# Patient Record
Sex: FEMALE | Race: WHITE | NOT HISPANIC OR LATINO | Employment: FULL TIME | URBAN - METROPOLITAN AREA
[De-identification: names, ages, dates, MRNs, and addresses within clinical notes are randomized per-mention and may not be internally consistent; named-entity substitution may affect disease eponyms.]

---

## 2019-10-04 ENCOUNTER — PREP FOR PROCEDURE (OUTPATIENT)
Dept: GASTROENTEROLOGY | Facility: CLINIC | Age: 19
End: 2019-10-04

## 2019-10-04 ENCOUNTER — OFFICE VISIT (OUTPATIENT)
Dept: GASTROENTEROLOGY | Facility: CLINIC | Age: 19
End: 2019-10-04
Payer: COMMERCIAL

## 2019-10-04 ENCOUNTER — TELEPHONE (OUTPATIENT)
Dept: GASTROENTEROLOGY | Facility: CLINIC | Age: 19
End: 2019-10-04

## 2019-10-04 VITALS
HEART RATE: 72 BPM | BODY MASS INDEX: 35.22 KG/M2 | DIASTOLIC BLOOD PRESSURE: 90 MMHG | SYSTOLIC BLOOD PRESSURE: 128 MMHG | HEIGHT: 69 IN | WEIGHT: 237.8 LBS

## 2019-10-04 DIAGNOSIS — K59.00 CONSTIPATION, UNSPECIFIED CONSTIPATION TYPE: ICD-10-CM

## 2019-10-04 DIAGNOSIS — R10.84 GENERALIZED ABDOMINAL PAIN: Primary | ICD-10-CM

## 2019-10-04 DIAGNOSIS — R10.9 ABDOMINAL PAIN, UNSPECIFIED ABDOMINAL LOCATION: Primary | ICD-10-CM

## 2019-10-04 DIAGNOSIS — R19.7 DIARRHEA, UNSPECIFIED TYPE: ICD-10-CM

## 2019-10-04 PROCEDURE — 99203 OFFICE O/P NEW LOW 30 MIN: CPT | Performed by: PHYSICIAN ASSISTANT

## 2019-10-04 RX ORDER — DICYCLOMINE HCL 20 MG
20 TABLET ORAL EVERY 6 HOURS
Qty: 60 TABLET | Refills: 3 | Status: SHIPPED | OUTPATIENT
Start: 2019-10-04 | End: 2021-07-21 | Stop reason: ALTCHOICE

## 2019-10-04 NOTE — PROGRESS NOTES
[facility] - Outpatient Consultation  Mike Devi 22 y o  female MRN: [id number]  Encounter: [id number]          ASSESSMENT AND PLAN:      1  Generalized abdominal pain  2  Diarrhea, unspecified type  3  Constipation, unspecified constipation type    Suspect she has IBS  Will obtain recent labs  Will plan EGD and colonoscopy to rule out crohns/celiac  Will start fiber, probiotic and dicyclomine  Discussed low dairy, spice and fat diet    ______________________________________________________________________    HPI:  18-year-old female presents for evaluation of abdominal pain, diarrhea and constipation  Is on ongoing for many years however there worsening over the past year  She believes that they became worse after IUD was placed  The IUD position has been checked several times by her gynecologist she states that she frequently gets severe abdominal pains which were followed by the urge to have a bowel movement  She reports that sometimes she is able to have a bowel movement is loose and watery other times she can have bowel movement  She denies any rectal bleeding or unexpected weight loss  She does admit to frequently waking up in the middle the night with abdominal pain  There is no fevers or chills  She has never had any workup for these symptoms  She believes that breads and pastas make her symptoms worse however she also relates that fried fatty foods worsen her symptoms  There is no family history of any series gastrointestinal issues  She denies any travel, sick contacts, antibiotics     She is on no over-the-counter medications  REVIEW OF SYSTEMS:    CONSTITUTIONAL: Denies any fever, chills, rigors, and weight loss  HEENT: No earache or tinnitus  Denies hearing loss or visual disturbances  CARDIOVASCULAR: No chest pain or palpitations  RESPIRATORY: Denies any cough, hemoptysis, shortness of breath or dyspnea on exertion    GASTROINTESTINAL: As noted in the History of Present Illness  GENITOURINARY: No problems with urination  Denies any hematuria or dysuria  NEUROLOGIC: No dizziness or vertigo, denies headaches  MUSCULOSKELETAL: Denies any muscle or joint pain  SKIN: Denies skin rashes or itching  ENDOCRINE: Denies excessive thirst  Denies intolerance to heat or cold  PSYCHOSOCIAL: Denies depression or anxiety  Denies any recent memory loss  Historical Information   Past Medical History:   Diagnosis Date    Medical history reviewed with no changes      History reviewed  No pertinent surgical history  Social History   Social History     Substance and Sexual Activity   Alcohol Use Yes    Comment: social     Social History     Substance and Sexual Activity   Drug Use Never     Social History     Tobacco Use   Smoking Status Never Smoker   Smokeless Tobacco Never Used     Family History   Problem Relation Age of Onset    No Known Problems Mother     No Known Problems Father        Meds/Allergies       Current Outpatient Medications:     levonorgestrel (KYLEENA) 19 5 MG intrauterine device    dicyclomine (BENTYL) 20 mg tablet    No Known Allergies        Objective     Blood pressure 128/90, pulse 72, height 5' 9" (1 753 m), weight 108 kg (237 lb 12 8 oz)  Body mass index is 35 12 kg/m²  PHYSICAL EXAM:      General Appearance:   Alert, cooperative, no distress   HEENT:   Normocephalic, atraumatic, anicteric      Neck:  Supple, symmetrical, trachea midline   Lungs:   Clear to auscultation bilaterally; no rales, rhonchi or wheezing; respirations unlabored    Heart[de-identified]   Regular rate and rhythm; no murmur, rub, or gallop     Abdomen:   Soft, non-tender, non-distended; normal bowel sounds; no masses, no organomegaly    Genitalia:   Deferred    Rectal:   Deferred    Extremities:  No cyanosis, clubbing or edema    Pulses:  2+ and symmetric    Skin:  No jaundice, rashes, or lesions    Lymph nodes:  No palpable cervical lymphadenopathy        Lab Results:   No visits with results within 1 Day(s) from this visit  Latest known visit with results is:   No results found for any previous visit  Radiology Results:   No results found

## 2019-10-04 NOTE — TELEPHONE ENCOUNTER
As requested by Manolo Macias, called [facility] to get bw for pt for the last year    Will have bw faxed

## 2019-10-11 ENCOUNTER — APPOINTMENT (OUTPATIENT)
Dept: LAB | Facility: HOSPITAL | Age: 19
End: 2019-10-11
Payer: COMMERCIAL

## 2019-10-11 DIAGNOSIS — R10.9 ABDOMINAL PAIN, UNSPECIFIED ABDOMINAL LOCATION: ICD-10-CM

## 2019-10-11 LAB
ALBUMIN SERPL BCP-MCNC: 3.8 G/DL (ref 3.5–5)
ALP SERPL-CCNC: 72 U/L (ref 46–116)
ALT SERPL W P-5'-P-CCNC: 23 U/L (ref 12–78)
ANION GAP SERPL CALCULATED.3IONS-SCNC: 5 MMOL/L (ref 4–13)
AST SERPL W P-5'-P-CCNC: 14 U/L (ref 5–45)
BILIRUB SERPL-MCNC: 0.5 MG/DL (ref 0.2–1)
BUN SERPL-MCNC: 12 MG/DL (ref 5–25)
CALCIUM SERPL-MCNC: 9.1 MG/DL (ref 8.3–10.1)
CHLORIDE SERPL-SCNC: 104 MMOL/L (ref 100–108)
CO2 SERPL-SCNC: 30 MMOL/L (ref 21–32)
CREAT SERPL-MCNC: 0.78 MG/DL (ref 0.6–1.3)
CRP SERPL QL: 4.8 MG/L
GFR SERPL CREATININE-BSD FRML MDRD: 109 ML/MIN/1.73SQ M
GLUCOSE SERPL-MCNC: 72 MG/DL (ref 65–140)
POTASSIUM SERPL-SCNC: 4.4 MMOL/L (ref 3.5–5.3)
PROT SERPL-MCNC: 8 G/DL (ref 6.4–8.2)
SODIUM SERPL-SCNC: 139 MMOL/L (ref 136–145)
T4 FREE SERPL-MCNC: 0.75 NG/DL (ref 0.76–1.46)
TSH SERPL DL<=0.05 MIU/L-ACNC: 1.39 UIU/ML (ref 0.36–3.74)

## 2019-10-11 PROCEDURE — 84443 ASSAY THYROID STIM HORMONE: CPT

## 2019-10-11 PROCEDURE — 86140 C-REACTIVE PROTEIN: CPT

## 2019-10-11 PROCEDURE — 80053 COMPREHEN METABOLIC PANEL: CPT

## 2019-10-11 PROCEDURE — 83516 IMMUNOASSAY NONANTIBODY: CPT

## 2019-10-11 PROCEDURE — 36415 COLL VENOUS BLD VENIPUNCTURE: CPT

## 2019-10-11 PROCEDURE — 84439 ASSAY OF FREE THYROXINE: CPT

## 2019-10-11 PROCEDURE — 82784 ASSAY IGA/IGD/IGG/IGM EACH: CPT

## 2019-10-11 PROCEDURE — 86255 FLUORESCENT ANTIBODY SCREEN: CPT

## 2019-10-12 LAB
ENDOMYSIUM IGA SER QL: NEGATIVE
GLIADIN PEPTIDE IGA SER-ACNC: 6 UNITS (ref 0–19)
GLIADIN PEPTIDE IGG SER-ACNC: 3 UNITS (ref 0–19)
IGA SERPL-MCNC: 324 MG/DL (ref 87–352)
TTG IGA SER-ACNC: <2 U/ML (ref 0–3)
TTG IGG SER-ACNC: <2 U/ML (ref 0–5)

## 2019-11-13 ENCOUNTER — TELEPHONE (OUTPATIENT)
Dept: SURGERY | Facility: HOSPITAL | Age: 19
End: 2019-11-13

## 2019-11-14 ENCOUNTER — TELEPHONE (OUTPATIENT)
Dept: SURGERY | Facility: HOSPITAL | Age: 19
End: 2019-11-14

## 2019-11-14 RX ORDER — SODIUM CHLORIDE, SODIUM LACTATE, POTASSIUM CHLORIDE, CALCIUM CHLORIDE 600; 310; 30; 20 MG/100ML; MG/100ML; MG/100ML; MG/100ML
125 INJECTION, SOLUTION INTRAVENOUS CONTINUOUS
Status: CANCELLED | OUTPATIENT
Start: 2019-11-14

## 2019-11-15 ENCOUNTER — HOSPITAL ENCOUNTER (OUTPATIENT)
Dept: GASTROENTEROLOGY | Facility: HOSPITAL | Age: 19
Setting detail: OUTPATIENT SURGERY
Discharge: HOME | End: 2019-11-15
Attending: INTERNAL MEDICINE
Payer: COMMERCIAL

## 2019-11-15 ENCOUNTER — ANESTHESIA (OUTPATIENT)
Dept: GASTROENTEROLOGY | Facility: HOSPITAL | Age: 19
End: 2019-11-15

## 2019-11-15 ENCOUNTER — ANESTHESIA EVENT (OUTPATIENT)
Dept: GASTROENTEROLOGY | Facility: HOSPITAL | Age: 19
End: 2019-11-15

## 2019-11-15 VITALS
HEART RATE: 69 BPM | OXYGEN SATURATION: 96 % | HEIGHT: 69 IN | DIASTOLIC BLOOD PRESSURE: 74 MMHG | RESPIRATION RATE: 16 BRPM | SYSTOLIC BLOOD PRESSURE: 116 MMHG | WEIGHT: 238.76 LBS | TEMPERATURE: 97.8 F | BODY MASS INDEX: 35.36 KG/M2

## 2019-11-15 DIAGNOSIS — R10.84 GENERALIZED ABDOMINAL PAIN: ICD-10-CM

## 2019-11-15 DIAGNOSIS — R19.7 DIARRHEA, UNSPECIFIED TYPE: ICD-10-CM

## 2019-11-15 DIAGNOSIS — K59.00 CONSTIPATION, UNSPECIFIED CONSTIPATION TYPE: ICD-10-CM

## 2019-11-15 LAB
EXT PREGNANCY TEST URINE: NEGATIVE
EXT. CONTROL: NORMAL

## 2019-11-15 PROCEDURE — 81025 URINE PREGNANCY TEST: CPT | Performed by: ANESTHESIOLOGY

## 2019-11-15 PROCEDURE — 45378 DIAGNOSTIC COLONOSCOPY: CPT | Performed by: INTERNAL MEDICINE

## 2019-11-15 PROCEDURE — NC001 PR NO CHARGE: Performed by: INTERNAL MEDICINE

## 2019-11-15 PROCEDURE — 88305 TISSUE EXAM BY PATHOLOGIST: CPT | Performed by: PATHOLOGY

## 2019-11-15 PROCEDURE — 43239 EGD BIOPSY SINGLE/MULTIPLE: CPT | Performed by: INTERNAL MEDICINE

## 2019-11-15 PROCEDURE — 88342 IMHCHEM/IMCYTCHM 1ST ANTB: CPT | Performed by: PATHOLOGY

## 2019-11-15 RX ORDER — LIDOCAINE HYDROCHLORIDE 20 MG/ML
INJECTION, SOLUTION INFILTRATION; PERINEURAL AS NEEDED
Status: DISCONTINUED | OUTPATIENT
Start: 2019-11-15 | End: 2019-11-15 | Stop reason: SURG

## 2019-11-15 RX ORDER — PROPOFOL 10 MG/ML
INJECTION, EMULSION INTRAVENOUS AS NEEDED
Status: DISCONTINUED | OUTPATIENT
Start: 2019-11-15 | End: 2019-11-15 | Stop reason: SURG

## 2019-11-15 RX ORDER — FENTANYL CITRATE 50 UG/ML
INJECTION, SOLUTION INTRAMUSCULAR; INTRAVENOUS AS NEEDED
Status: DISCONTINUED | OUTPATIENT
Start: 2019-11-15 | End: 2019-11-15 | Stop reason: SURG

## 2019-11-15 RX ORDER — SODIUM CHLORIDE, SODIUM LACTATE, POTASSIUM CHLORIDE, CALCIUM CHLORIDE 600; 310; 30; 20 MG/100ML; MG/100ML; MG/100ML; MG/100ML
125 INJECTION, SOLUTION INTRAVENOUS CONTINUOUS
Status: DISCONTINUED | OUTPATIENT
Start: 2019-11-15 | End: 2019-11-19 | Stop reason: HOSPADM

## 2019-11-15 RX ADMIN — LIDOCAINE HYDROCHLORIDE 5 ML: 20 INJECTION, SOLUTION INFILTRATION; PERINEURAL at 08:14

## 2019-11-15 RX ADMIN — PROPOFOL 5 MG: 10 INJECTION, EMULSION INTRAVENOUS at 08:17

## 2019-11-15 RX ADMIN — PROPOFOL 50 MG: 10 INJECTION, EMULSION INTRAVENOUS at 08:19

## 2019-11-15 RX ADMIN — PROPOFOL 100 MG: 10 INJECTION, EMULSION INTRAVENOUS at 08:27

## 2019-11-15 RX ADMIN — PROPOFOL 150 MG: 10 INJECTION, EMULSION INTRAVENOUS at 08:14

## 2019-11-15 RX ADMIN — FENTANYL CITRATE 50 MCG: 50 INJECTION, SOLUTION INTRAMUSCULAR; INTRAVENOUS at 08:14

## 2019-11-15 NOTE — ANESTHESIA POSTPROCEDURE EVALUATION
Post-Op Assessment Note    CV Status:  Stable  Pain Score: 0    Pain management: adequate     Mental Status:  Alert and awake   Hydration Status:  Euvolemic   PONV Controlled:  Controlled   Airway Patency:  Patent   Post Op Vitals Reviewed: Yes      Staff: Anesthesiologist, CRNA           /69 (11/15/19 0833)    Temp 98 1 °F (36 7 °C) (11/15/19 0833)    Pulse 71 (11/15/19 0833)   Resp 16 (11/15/19 0833)    SpO2 100 % (11/15/19 4900)

## 2019-11-15 NOTE — DISCHARGE INSTRUCTIONS
Upper Endoscopy   WHAT YOU NEED TO KNOW:   An upper endoscopy is also called an upper gastrointestinal (GI) endoscopy, or an esophagogastroduodenoscopy (EGD)  You may feel bloated, gassy, or have some abdominal discomfort after your procedure  Your throat may be sore for 24 to 36 hours  You may burp or pass gas from air that is still inside your body  DISCHARGE INSTRUCTIONS:   Call 911 for any of the following:   · You have sudden chest pain or trouble breathing  Seek care immediately if:   · You feel dizzy or faint  · You have trouble swallowing  · Your bowel movements are very dark or black  · Your abdomen is hard and firm and you have severe pain  · You vomit blood  Contact your healthcare provider if:   · You feel full or bloated and cannot burp or pass gas  · You have not had a bowel movement for 3 days after your procedure  · You have neck pain  · You have a fever or chills  · You have nausea or are vomiting  · You have a rash or hives  · You have questions or concerns about your endoscopy  Relieve a sore throat:  Suck on throat lozenges or crushed ice  Gargle with a small amount of warm salt water  Mix 1 teaspoon of salt and 1 cup of warm water to make salt water  Relieve gas and discomfort from bloating:  Lie on your right side with a heating pad on your abdomen  Take short walks to help pass gas  Eat small meals until bloating is relieved  Rest after your procedure: You have been given medicine to relax you  Do not  drive or make important decisions until the day after your procedure  Return to your normal activity as directed  You can usually return to work the day after your procedure  Follow up with your healthcare provider as directed:  Write down your questions so you remember to ask them during your visits     © 2017 [address] is for End User's use only and may not be sold, redistributed or otherwise used for commercial purposes  All illustrations and images included in CareNotes® are the copyrighted property of [employer]  or [employer]  The above information is an  only  It is not intended as medical advice for individual conditions or treatments  Talk to your doctor, nurse or pharmacist before following any medical regimen to see if it is safe and effective for you  Colonoscopy   WHAT YOU NEED TO KNOW:   A colonoscopy is a procedure to examine the inside of your colon (intestine) with a scope  Polyps or tissue growths may have been removed during your colonoscopy  It is normal to feel bloated and to have some abdominal discomfort  You should be passing gas  If you have hemorrhoids or you had polyps removed, you may have a small amount of bleeding  DISCHARGE INSTRUCTIONS:   Seek care immediately if:   · You have a large amount of bright red blood in your bowel movements  · Your abdomen is hard and firm and you have severe pain  · You have sudden trouble breathing  Contact your healthcare provider if:   · You develop a rash or hives  · You have a fever within 24 hours of your procedure       · You have not had a bowel movement for 3 days after your procedure  · You have questions or concerns about your condition or care  Activity:   · Do not lift, strain, or run  for 3 days after your procedure  · Rest after your procedure  You have been given medicine to relax you  Do not  drive or make important decisions until the day after your procedure  Return to your normal activity as directed  · Relieve gas and discomfort from bloating  by lying on your right side with a heating pad on your abdomen  You may need to take short walks to help the gas move out  Eat small meals until bloating is relieved  If you had polyps removed: For 7 days after your procedure:  · Do not  take aspirin  · Do not  go on long car rides    Follow up with your healthcare provider as directed:  Write down your questions so you remember to ask them during your visits  © 2017 [address] is for End User's use only and may not be sold, redistributed or otherwise used for commercial purposes  All illustrations and images included in CareNotes® are the copyrighted property of [employer]  or [employer]  The above information is an  only  It is not intended as medical advice for individual conditions or treatments  Talk to your doctor, nurse or pharmacist before following any medical regimen to see if it is safe and effective for you

## 2019-11-15 NOTE — ANESTHESIA PREPROCEDURE EVALUATION
Review of Systems/Medical History  Patient summary reviewed        Cardiovascular  Negative cardio ROS    Pulmonary  Negative pulmonary ROS        GI/Hepatic  Negative GI/hepatic ROS          Negative  ROS        Endo/Other  Negative endo/other ROS      GYN  Negative gynecology ROS          Hematology  Negative hematology ROS      Musculoskeletal  Negative musculoskeletal ROS        Neurology  Negative neurology ROS      Psychology   Negative psychology ROS              Physical Exam    Airway    Mallampati score: I  TM Distance: >3 FB  Neck ROM: full     Dental       Cardiovascular  Comment: Negative ROS, Cardiovascular exam normal    Pulmonary  Pulmonary exam normal     Other Findings        Anesthesia Plan  ASA Score- 1     Anesthesia Type- IV sedation with anesthesia with ASA Monitors  Additional Monitors:   Airway Plan:         Plan Factors-    Induction- intravenous  Postoperative Plan-     Informed Consent- Anesthetic plan and risks discussed with patient  I personally reviewed this patient with the CRNA  Discussed and agreed on the Anesthesia Plan with the CRNA  Tennille Aguiar

## 2019-11-15 NOTE — H&P
History and Physical - [facility]  Zackary Reyna 22 y o  female MRN: [id number]      HPI: Zackary Reyna is a 22y o  year old female who presents for the evaluation of generalized abdominal pain, diarrhea, constipation      REVIEW OF SYSTEMS: Per the HPI, and otherwise unremarkable  Historical Information   Past Medical History:   Diagnosis Date    Medical history reviewed with no changes      No past surgical history on file  Social History   Social History     Substance and Sexual Activity   Alcohol Use Yes    Comment: social     Social History     Substance and Sexual Activity   Drug Use Never     Social History     Tobacco Use   Smoking Status Never Smoker   Smokeless Tobacco Never Used     Family History   Problem Relation Age of Onset    No Known Problems Mother     No Known Problems Father        Meds/Allergies       (Not in a hospital admission)    No Known Allergies    Objective     There were no vitals taken for this visit  PHYSICAL EXAM    Gen: NAD  CV: RRR  CHEST: Clear  ABD: soft, NT/ND  EXT: no edema      ASSESSMENT/PLAN:  This is a 22y o  year old female here for esophagogastroduodenoscopy with biopsies, colonoscopy with possible biopsies the, and she is stable and optimized for her procedure

## 2019-11-21 ENCOUNTER — TELEPHONE (OUTPATIENT)
Dept: GASTROENTEROLOGY | Facility: CLINIC | Age: 19
End: 2019-11-21

## 2019-11-21 NOTE — TELEPHONE ENCOUNTER
Jerry pt - Pt calling to inquire about her lab results, pt said she missed a call from the office  Please call [phone] ty

## 2019-11-22 ENCOUNTER — OFFICE VISIT (OUTPATIENT)
Dept: GASTROENTEROLOGY | Facility: CLINIC | Age: 19
End: 2019-11-22
Payer: COMMERCIAL

## 2019-11-22 VITALS
WEIGHT: 241 LBS | DIASTOLIC BLOOD PRESSURE: 84 MMHG | BODY MASS INDEX: 34.5 KG/M2 | SYSTOLIC BLOOD PRESSURE: 128 MMHG | HEIGHT: 70 IN | HEART RATE: 68 BPM

## 2019-11-22 DIAGNOSIS — K58.2 IRRITABLE BOWEL SYNDROME WITH BOTH CONSTIPATION AND DIARRHEA: Primary | ICD-10-CM

## 2019-11-22 PROCEDURE — 99213 OFFICE O/P EST LOW 20 MIN: CPT | Performed by: PHYSICIAN ASSISTANT

## 2019-11-22 NOTE — PROGRESS NOTES
[facility] - Outpatient Follow-up Note  Ron Mcwilliams 22 y o  female MRN: [id number]  Encounter: [id number]          ASSESSMENT AND PLAN:      1  Irritable bowel syndrome with both constipation and diarrhea  EGD and Colonoscopy with biopsies were negative   She has not been consistent with her fiber and probiotic - she will start  She has not taken dicyclomine - she will try it  Discussed various dietary modifications which she admits would be difficult as she is living at college  She feels she has more constipation than diarrhea  Start very low dose Amitiza - 8mcg daily    ______________________________________________________________________    SUBJECTIVE:  49-year-old female with mixed irritable bowel syndrome presents for follow-up  After her last visit she underwent an EGD and colonoscopy both of which were negative  Biopsies were taken of the stomach and the small intestine which were reported as normal   She admits that she has not been consistent with her fiber and probiotic  She continues to get episodes of severe cramping abdominal pain  Although she has both diarrhea and constipation she feels that constipation is more predominant and troublesome symptom  She denies any nausea, vomiting, heartburn, nocturnal symptoms, rectal bleeding or unexpected weight loss  She is currently attending college at [employer] and admits that she finds it difficult to eat a healthy diet  REVIEW OF SYSTEMS IS OTHERWISE NEGATIVE  Historical Information   Past Medical History:   Diagnosis Date    Medical history reviewed with no changes      History reviewed  No pertinent surgical history    Social History   Social History     Substance and Sexual Activity   Alcohol Use Yes    Alcohol/week: 2 0 standard drinks    Types: 2 Shots of liquor per week    Frequency: 2-3 times a week    Drinks per session: 1 or 2    Comment: social     Social History     Substance and Sexual Activity Drug Use Never     Social History     Tobacco Use   Smoking Status Never Smoker   Smokeless Tobacco Never Used     Family History   Problem Relation Age of Onset    No Known Problems Mother     No Known Problems Father        Meds/Allergies       Current Outpatient Medications:     dicyclomine (BENTYL) 20 mg tablet    levonorgestrel (KYLEENA) 19 5 MG intrauterine device    No Known Allergies        Objective     Blood pressure 128/84, pulse 68, height 5' 9 5" (1 765 m), weight 109 kg (241 lb)  Body mass index is 35 08 kg/m²  PHYSICAL EXAM:      General Appearance:   Alert, cooperative, no distress   HEENT:   Normocephalic, atraumatic, anicteric      Neck:  Supple, symmetrical, trachea midline   Lungs:   Clear to auscultation bilaterally; no rales, rhonchi or wheezing; respirations unlabored    Heart[de-identified]   Regular rate and rhythm; no murmur, rub, or gallop  Abdomen:   Soft, non-tender, non-distended; normal bowel sounds; no masses, no organomegaly    Genitalia:   Deferred    Rectal:   Deferred    Extremities:  No cyanosis, clubbing or edema    Pulses:  2+ and symmetric    Skin:  No jaundice, rashes, or lesions    Lymph nodes:  No palpable cervical lymphadenopathy        Lab Results:   No visits with results within 1 Day(s) from this visit     Latest known visit with results is:   Hospital Outpatient Visit on 11/15/2019   Component Date Value    EXT Preg Test, Ur 11/15/2019 Negative     Control 11/15/2019 Valid     Case Report 11/15/2019                      Value:Surgical Pathology Report                         Case: [id number]                                   Authorizing Provider:  Satinder Lipscomb DO          Collected:           11/15/2019 2712              Ordering Location:      [facility]       Received:            11/15/2019 [facility]                                                             Pathologist:           Dharmesh Burrows MD Specimens:   A) - Duodenum                                                                                       B) - Stomach                                                                               Final Diagnosis 11/15/2019                      Value: This result contains rich text formatting which cannot be displayed here   Additional Information 11/15/2019                      Value: This result contains rich text formatting which cannot be displayed here  Rosa Foster Gross Description 11/15/2019                      Value: This result contains rich text formatting which cannot be displayed here   Clinical Information 11/15/2019                      Value:Cold biopsies r/o C Sprue         Radiology Results:   No results found

## 2019-11-29 ENCOUNTER — TELEPHONE (OUTPATIENT)
Dept: GASTROENTEROLOGY | Facility: CLINIC | Age: 19
End: 2019-11-29

## 2019-11-29 NOTE — TELEPHONE ENCOUNTER
----- Message from Satinder Lipscomb DO sent at 11/27/2019  7:14 PM EST -----  I attempted to call this patient by telephone  Her voice message says please do not leave a voice message because [place]  Can be please make a note to contact this patient again sometime in the future to report to her that her biopsies of her stomach in her small intestine were benign  There is no cancer seen no Helicobacter pylori identified  Nothing worrisome was seen

## 2020-07-13 ENCOUNTER — ANNUAL EXAM (OUTPATIENT)
Dept: OBGYN CLINIC | Facility: CLINIC | Age: 20
End: 2020-07-13
Payer: COMMERCIAL

## 2020-07-13 VITALS
TEMPERATURE: 96.4 F | SYSTOLIC BLOOD PRESSURE: 122 MMHG | BODY MASS INDEX: 35.36 KG/M2 | WEIGHT: 247 LBS | DIASTOLIC BLOOD PRESSURE: 64 MMHG | HEIGHT: 70 IN

## 2020-07-13 DIAGNOSIS — Z01.419 ROUTINE GYNECOLOGICAL EXAMINATION: Primary | ICD-10-CM

## 2020-07-13 DIAGNOSIS — Z11.3 SCREENING FOR STD (SEXUALLY TRANSMITTED DISEASE): ICD-10-CM

## 2020-07-13 PROCEDURE — 99385 PREV VISIT NEW AGE 18-39: CPT | Performed by: OBSTETRICS & GYNECOLOGY

## 2020-07-13 PROCEDURE — 87591 N.GONORRHOEAE DNA AMP PROB: CPT | Performed by: OBSTETRICS & GYNECOLOGY

## 2020-07-13 PROCEDURE — G0145 SCR C/V CYTO,THINLAYER,RESCR: HCPCS | Performed by: OBSTETRICS & GYNECOLOGY

## 2020-07-13 PROCEDURE — 87491 CHLMYD TRACH DNA AMP PROBE: CPT | Performed by: OBSTETRICS & GYNECOLOGY

## 2020-07-13 NOTE — PROGRESS NOTES
Dominic Ríos is a 23 y o  female who presents for annual well woman exam  Periods are regular every 28-30 days, lasting a few days  No intermenstrual bleeding, spotting, or discharge  Current contraception: IUD and Kyleena  History of abnormal Pap smear: no    Menstrual History:  OB History        0    Para   0    Term   0       0    AB   0    Living   0       SAB   0    TAB   0    Ectopic   0    Multiple   0    Live Births   0                  No LMP recorded  The following portions of the patient's history were reviewed and updated as appropriate: allergies, current medications, past family history, past medical history, past social history, past surgical history and problem list     Review of Systems  Review of Systems   Constitutional: Negative for activity change, appetite change, chills, fatigue and fever  Respiratory: Negative for cough and shortness of breath  Cardiovascular: Negative for chest pain, palpitations and leg swelling  Gastrointestinal: Negative for abdominal pain, constipation, diarrhea, nausea and vomiting  Genitourinary: Negative for difficulty urinating, dysuria, flank pain, frequency, hematuria, urgency and vaginal discharge  Neurological: Negative for dizziness and headaches  Psychiatric/Behavioral: Negative for confusion            Objective      /64   Temp (!) 96 4 °F (35 8 °C) (Tympanic)   Ht 5' 9 5" (1 765 m)   Wt 112 kg (247 lb)   BMI 35 95 kg/m²     Physical Exam  OBGyn Exam     General:   alert and oriented, in no acute distress, alert, appears stated age and cooperative   Heart: regular rate and rhythm, S1, S2 normal, no murmur, click, rub or gallop   Lungs: clear to auscultation bilaterally   Abdomen: soft, non-tender, without masses or organomegaly   Vulva: normal   Vagina: normal mucosa, normal discharge   Cervix: no cervical motion tenderness and no lesions, IUD string seen   Uterus: normal size Adnexa:  Breast Exam:  normal adnexa and no mass, fullness, tenderness  breasts appear normal, no suspicious masses, no skin or nipple changes or axillary nodes  Assessment      @well woman@   27-year-old female  Annual exam  IUD [place] contraception  Plan   Pap/reflux  Diet/exercise  Calcium/vitamin-D   return to office for annual exam   All questions answered  There are no Patient Instructions on file for this visit

## 2020-07-16 LAB
C TRACH DNA SPEC QL NAA+PROBE: POSITIVE
N GONORRHOEA DNA SPEC QL NAA+PROBE: NEGATIVE

## 2020-07-20 LAB
LAB AP GYN PRIMARY INTERPRETATION: NORMAL
Lab: NORMAL

## 2020-07-24 ENCOUNTER — TELEPHONE (OUTPATIENT)
Dept: OBGYN CLINIC | Facility: CLINIC | Age: 20
End: 2020-07-24

## 2020-07-26 NOTE — TELEPHONE ENCOUNTER
juan discuss with patient in details   Aware she and partner need to be treated aware she need to use condom for sexual activity

## 2021-07-21 ENCOUNTER — OFFICE VISIT (OUTPATIENT)
Dept: OBGYN CLINIC | Facility: CLINIC | Age: 21
End: 2021-07-21
Payer: COMMERCIAL

## 2021-07-21 VITALS
SYSTOLIC BLOOD PRESSURE: 122 MMHG | DIASTOLIC BLOOD PRESSURE: 86 MMHG | HEIGHT: 70 IN | BODY MASS INDEX: 38.71 KG/M2 | WEIGHT: 270.4 LBS

## 2021-07-21 DIAGNOSIS — Z11.3 SCREENING FOR STD (SEXUALLY TRANSMITTED DISEASE): ICD-10-CM

## 2021-07-21 DIAGNOSIS — Z86.19 HISTORY OF CHLAMYDIA INFECTION: ICD-10-CM

## 2021-07-21 DIAGNOSIS — Z01.419 ENCOUNTER FOR GYNECOLOGICAL EXAMINATION (GENERAL) (ROUTINE) WITHOUT ABNORMAL FINDINGS: Primary | ICD-10-CM

## 2021-07-21 PROCEDURE — 99385 PREV VISIT NEW AGE 18-39: CPT | Performed by: PHYSICIAN ASSISTANT

## 2021-07-21 NOTE — ASSESSMENT & PLAN NOTE
The current ASCCP guidelines were reviewed  Patient's last pap was 7/13/20 - WNL and therefore, a pap is not indicated at this time  I emphasized the importance of an annual pelvic and breast exam  Patient ok to defer pap today

## 2021-07-23 LAB
C TRACH RRNA SPEC QL NAA+PROBE: NOT DETECTED
N GONORRHOEA RRNA SPEC QL NAA+PROBE: NOT DETECTED

## 2021-11-10 ENCOUNTER — PROCEDURE VISIT (OUTPATIENT)
Dept: OBGYN CLINIC | Facility: CLINIC | Age: 21
End: 2021-11-10
Payer: COMMERCIAL

## 2021-11-10 VITALS
DIASTOLIC BLOOD PRESSURE: 78 MMHG | WEIGHT: 270 LBS | SYSTOLIC BLOOD PRESSURE: 124 MMHG | HEIGHT: 70 IN | BODY MASS INDEX: 38.65 KG/M2

## 2021-11-10 DIAGNOSIS — Z30.09 ENCOUNTER FOR OTHER GENERAL COUNSELING OR ADVICE ON CONTRACEPTION: ICD-10-CM

## 2021-11-10 DIAGNOSIS — Z30.432 ENCOUNTER FOR IUD REMOVAL: Primary | ICD-10-CM

## 2021-11-10 PROCEDURE — 58301 REMOVE INTRAUTERINE DEVICE: CPT | Performed by: PHYSICIAN ASSISTANT

## 2021-11-10 RX ORDER — NORETHINDRONE ACETATE AND ETHINYL ESTRADIOL 1.5-30(21)
1 KIT ORAL DAILY
Qty: 28 TABLET | Refills: 9 | Status: SHIPPED | OUTPATIENT
Start: 2021-11-10 | End: 2022-07-22 | Stop reason: SDUPTHER

## 2021-12-02 PROCEDURE — U0005 INFEC AGEN DETEC AMPLI PROBE: HCPCS | Performed by: INTERNAL MEDICINE

## 2021-12-02 PROCEDURE — U0003 INFECTIOUS AGENT DETECTION BY NUCLEIC ACID (DNA OR RNA); SEVERE ACUTE RESPIRATORY SYNDROME CORONAVIRUS 2 (SARS-COV-2) (CORONAVIRUS DISEASE [COVID-19]), AMPLIFIED PROBE TECHNIQUE, MAKING USE OF HIGH THROUGHPUT TECHNOLOGIES AS DESCRIBED BY CMS-2020-01-R: HCPCS | Performed by: INTERNAL MEDICINE

## 2021-12-14 PROCEDURE — U0003 INFECTIOUS AGENT DETECTION BY NUCLEIC ACID (DNA OR RNA); SEVERE ACUTE RESPIRATORY SYNDROME CORONAVIRUS 2 (SARS-COV-2) (CORONAVIRUS DISEASE [COVID-19]), AMPLIFIED PROBE TECHNIQUE, MAKING USE OF HIGH THROUGHPUT TECHNOLOGIES AS DESCRIBED BY CMS-2020-01-R: HCPCS | Performed by: INTERNAL MEDICINE

## 2021-12-14 PROCEDURE — U0005 INFEC AGEN DETEC AMPLI PROBE: HCPCS | Performed by: INTERNAL MEDICINE

## 2022-04-01 ENCOUNTER — HOSPITAL ENCOUNTER (EMERGENCY)
Facility: HOSPITAL | Age: 22
Discharge: HOME | End: 2022-04-01
Attending: EMERGENCY MEDICINE
Payer: COMMERCIAL

## 2022-04-01 VITALS
OXYGEN SATURATION: 96 % | TEMPERATURE: 98.9 F | SYSTOLIC BLOOD PRESSURE: 150 MMHG | HEART RATE: 126 BPM | HEIGHT: 70 IN | DIASTOLIC BLOOD PRESSURE: 85 MMHG | WEIGHT: 275 LBS | RESPIRATION RATE: 19 BRPM | BODY MASS INDEX: 39.37 KG/M2

## 2022-04-01 DIAGNOSIS — J40 BRONCHITIS: Primary | ICD-10-CM

## 2022-04-01 LAB
FLUAV RNA RESP QL NAA+PROBE: NEGATIVE
FLUBV RNA RESP QL NAA+PROBE: NEGATIVE
RSV RNA RESP QL NAA+PROBE: NEGATIVE
SARS-COV-2 RNA RESP QL NAA+PROBE: NEGATIVE

## 2022-04-01 PROCEDURE — 0241U HB NFCT DS VIR RESP RNA 4 TRGT: CPT | Performed by: EMERGENCY MEDICINE

## 2022-04-01 PROCEDURE — 99284 EMERGENCY DEPT VISIT MOD MDM: CPT | Performed by: EMERGENCY MEDICINE

## 2022-04-01 PROCEDURE — 99283 EMERGENCY DEPT VISIT LOW MDM: CPT

## 2022-04-01 RX ORDER — AZITHROMYCIN 250 MG/1
TABLET, FILM COATED ORAL
Qty: 6 TABLET | Refills: 0 | Status: SHIPPED | OUTPATIENT
Start: 2022-04-01 | End: 2022-04-05

## 2022-04-01 NOTE — ED NOTES
Discharge instructions reviewed with pt  Pt verbalized understanding  And has no further questions at this time       Eloisa Palencia RN  04/01/22 5893

## 2022-04-01 NOTE — ED PROVIDER NOTES
History  Chief Complaint   Patient presents with    Flu Symptoms     Pt presents to ED from home w/ fever, weakness, congestion, cough for three days  Pt denies pmh  Patient is a 53-year-old female  She has been sick since Monday with URI symptoms and postnasal drip  Today she developed a fever and body aches  Her chest burns when she coughs  Otherwise no chest pain or shortness of breath  No sore throat  No loss of taste or smell  She did have COVID back in November  No hemoptysis, calf pain or unilateral leg swelling  Symptoms are moderate in severity  Patient is a nonsmoker  No significant past medical history  Prior to Admission Medications   Prescriptions Last Dose Informant Patient Reported? Taking?   norethindrone-ethinyl estradiol-iron (Loestrin Fe 1 5/30) 1 5-30 MG-MCG tablet   No No   Sig: Take 1 tablet by mouth daily      Facility-Administered Medications: None       Past Medical History:   Diagnosis Date    Medical history reviewed with no changes        Past Surgical History:   Procedure Laterality Date    COLONOSCOPY W/ ENDOSCOPIC US  2019    WISDOM TOOTH EXTRACTION  06/2021       Family History   Problem Relation Age of Onset    No Known Problems Mother     No Known Problems Father     Cancer Maternal Grandmother     Cancer Maternal Aunt         s/p partial hysterectomy but unsure type of cancer     I have reviewed and agree with the history as documented  E-Cigarette/Vaping    E-Cigarette Use Never User      E-Cigarette/Vaping Substances    Nicotine No     THC No     CBD No     Flavoring No     Other No     Unknown No      Social History     Tobacco Use    Smoking status: Never Smoker    Smokeless tobacco: Never Used   Vaping Use    Vaping Use: Never used   Substance Use Topics    Alcohol use:  Yes     Alcohol/week: 2 0 standard drinks     Types: 2 Shots of liquor per week     Comment: social    Drug use: Never       Review of Systems   Constitutional: Positive for fever  Negative for chills  HENT: Positive for congestion and postnasal drip  Negative for sore throat  Eyes: Negative for pain, redness and visual disturbance  Respiratory: Positive for cough  Negative for shortness of breath  Cardiovascular: Negative for chest pain and leg swelling  Gastrointestinal: Negative for abdominal pain, diarrhea and vomiting  Endocrine: Negative for polydipsia and polyuria  Genitourinary: Negative for dysuria, frequency, hematuria, vaginal bleeding and vaginal discharge  Musculoskeletal: Positive for myalgias  Negative for back pain and neck pain  Skin: Negative for rash and wound  Allergic/Immunologic: Negative for immunocompromised state  Neurological: Negative for weakness, numbness and headaches  Hematological: Does not bruise/bleed easily  Psychiatric/Behavioral: Negative for hallucinations and suicidal ideas  All other systems reviewed and are negative  Physical Exam  Physical Exam  Vitals reviewed  Constitutional:       General: She is not in acute distress  Appearance: She is obese  HENT:      Head: Normocephalic and atraumatic  Nose: Nose normal       Mouth/Throat:      Mouth: Mucous membranes are moist    Eyes:      General:         Right eye: No discharge  Left eye: No discharge  Conjunctiva/sclera: Conjunctivae normal    Cardiovascular:      Rate and Rhythm: Regular rhythm  Tachycardia present  Pulses: Normal pulses  Heart sounds: Normal heart sounds  No murmur heard  No friction rub  No gallop  Pulmonary:      Effort: Pulmonary effort is normal  No respiratory distress  Breath sounds: Normal breath sounds  No stridor  No wheezing, rhonchi or rales  Abdominal:      General: Bowel sounds are normal  There is no distension  Palpations: Abdomen is soft  Tenderness: There is no abdominal tenderness  There is no right CVA tenderness, left CVA tenderness, guarding or rebound  Musculoskeletal:         General: No swelling, tenderness, deformity or signs of injury  Normal range of motion  Cervical back: Normal range of motion and neck supple  No rigidity  Right lower leg: No edema  Left lower leg: No edema  Comments: No calf tenderness or unilateral leg swelling  Skin:     General: Skin is warm and dry  Coloration: Skin is not jaundiced  Findings: No rash  Neurological:      General: No focal deficit present  Mental Status: She is alert and oriented to person, place, and time  Sensory: No sensory deficit  Motor: Motor function is intact  Psychiatric:         Mood and Affect: Mood normal          Behavior: Behavior normal          Vital Signs  ED Triage Vitals   Temperature Pulse Respirations Blood Pressure SpO2   04/01/22 1745 04/01/22 1745 04/01/22 1745 04/01/22 1747 04/01/22 1745   98 9 °F (37 2 °C) (!) 126 19 150/85 96 %      Temp Source Heart Rate Source Patient Position - Orthostatic VS BP Location FiO2 (%)   04/01/22 1745 -- -- -- --   Oral          Pain Score       --                  Vitals:    04/01/22 1745 04/01/22 1747   BP:  150/85   Pulse: (!) 126          Visual Acuity      ED Medications  Medications - No data to display    Diagnostic Studies  Results Reviewed     Procedure Component Value Units Date/Time    COVID/FLU/RSV - 2 hour TAT [[id number]] Collected: 04/01/22 1828    Lab Status: In process Specimen: Nares from Nose Updated: 04/01/22 1830                 No orders to display              Procedures  Procedures         ED Course                               SBIRT 20yo+      Most Recent Value   SBIRT (25 yo +)    In order to provide better care to our patients, we are screening all of our patients for alcohol and drug use  Would it be okay to ask you these screening questions? Yes Filed at: 04/01/2022 6748   Initial Alcohol Screen: US AUDIT-C     1   How often do you have a drink containing alcohol? 0 Filed at: 04/01/2022 1752   2  How many drinks containing alcohol do you have on a typical day you are drinking? 0 Filed at: 04/01/2022 1752   3a  Male UNDER 65: How often do you have five or more drinks on one occasion? 0 Filed at: 04/01/2022 1752   3b  FEMALE Any Age, or MALE 65+: How often do you have 4 or more drinks on one occassion? 0 Filed at: 04/01/2022 1752   Audit-C Score 0 Filed at: 04/01/2022 1752   JESSICA: How many times in the past year have you    Used an illegal drug or used a prescription medication for non-medical reasons? Never Filed at: 04/01/2022 1752                    MDM  Number of Diagnoses or Management Options  Diagnosis management comments: Will prescribe Zithromax to cover bronchitis  Doubt pneumonia  Oxygen saturations are good  No abnormal lung sounds  Will test for COVID in flu  Although patient has a tachycardia, I do not believe this is pulmonary embolism  Patient is not short of breath  No chest pain  She is mostly here for body aches and fever  No hemoptysis, calf pain or unilateral leg swelling  Disposition  Final diagnoses:   Bronchitis     Time reflects when diagnosis was documented in both MDM as applicable and the Disposition within this note     Time User Action Codes Description Comment    4/1/2022  6:40 PM Aleks, [address] Bronchitis       ED Disposition     ED Disposition Condition Date/Time Comment    Discharge Stable Fri Apr 1, 2022  6:39 PM Diane Gomez discharge to home/self care              Follow-up Information     Follow up With Specialties Details Why Seema Pena MD Internal Medicine In 1 week As needed Nehemiah   [phone]            Patient's Medications   Discharge Prescriptions    AZITHROMYCIN (ZITHROMAX Z-OMAR) 250 MG TABLET    Take 2 tablets today then 1 tablet daily x 4 days       Start Date: 4/1/2022  End Date: 4/5/2022       Order Dose: --       Quantity: 6 tablet Refills: 0       No discharge procedures on file      PDMP Review     None          ED Provider  Electronically Signed by           Svetlana Blair MD  04/01/22 7383

## 2022-07-22 ENCOUNTER — ANNUAL EXAM (OUTPATIENT)
Dept: OBGYN CLINIC | Facility: CLINIC | Age: 22
End: 2022-07-22
Payer: COMMERCIAL

## 2022-07-22 VITALS — SYSTOLIC BLOOD PRESSURE: 128 MMHG | BODY MASS INDEX: 38.14 KG/M2 | WEIGHT: 262 LBS | DIASTOLIC BLOOD PRESSURE: 78 MMHG

## 2022-07-22 DIAGNOSIS — Z30.09 ENCOUNTER FOR OTHER GENERAL COUNSELING OR ADVICE ON CONTRACEPTION: ICD-10-CM

## 2022-07-22 DIAGNOSIS — Z11.3 SCREENING FOR STD (SEXUALLY TRANSMITTED DISEASE): ICD-10-CM

## 2022-07-22 DIAGNOSIS — Z01.419 ENCNTR FOR GYN EXAM (GENERAL) (ROUTINE) W/O ABN FINDINGS: Primary | ICD-10-CM

## 2022-07-22 DIAGNOSIS — Z11.3 SCREENING FOR STD (SEXUALLY TRANSMITTED DISEASE): Primary | ICD-10-CM

## 2022-07-22 PROCEDURE — 87591 N.GONORRHOEAE DNA AMP PROB: CPT | Performed by: NURSE PRACTITIONER

## 2022-07-22 PROCEDURE — S0612 ANNUAL GYNECOLOGICAL EXAMINA: HCPCS | Performed by: NURSE PRACTITIONER

## 2022-07-22 PROCEDURE — 87491 CHLMYD TRACH DNA AMP PROBE: CPT | Performed by: NURSE PRACTITIONER

## 2022-07-22 RX ORDER — NORETHINDRONE ACETATE AND ETHINYL ESTRADIOL 1.5-30(21)
1 KIT ORAL DAILY
Qty: 84 TABLET | Refills: 3 | Status: SHIPPED | OUTPATIENT
Start: 2022-07-22

## 2022-07-22 NOTE — PROGRESS NOTES
Assessment/Plan   Diagnoses and all orders for this visit:    Encntr for gyn exam (general) (routine) w/o abn findings    Encounter for other general counseling or advice on contraception  -     norethindrone-ethinyl estradiol-iron (Loestrin Fe 1 5/30) 1 5-30 MG-MCG tablet; Take 1 tablet by mouth daily        Discussion    Reviewed with patient normal exam today  Pap deferred today  GC/CT testing done  Rx for OCP Loestrin Fe 1 5/30 sent to pharmacy  Normal breast exam today  Monthly SBEs advised  Vitamin D and Calcim Supplements advised  Exercise most days of the week  Follow with PCP for regular check-ups and blood work  RTO 1 year for annual or sooner as needed  Subjective     Bel Morel is a 23 y o  female who presents for annual well woman exam     Last exam 7/21/2021 Pap 7/13/2020 Normal   Pap guidelines reviewed with patient  Pap deferred today  Pt denies any abnormal vaginal discharge, itching, or odor  Pt currently sexually active with a male partner x 2 years, and would like STD testing today  Menstrual Cycle:  LMP: 7/14/2022  Period Cycle (Days): 28  Period Duration (Days): 4-6  Period Pattern: Regular  Menstrual Flow: Moderate, Heavy  Menstrual Control: Maxi pad  Dysmenorrhea: None  OB History     G 0   Contraception: OCP Loestrin Fe 1 5/30 Working well would like to continue  Practices monthly SBEs, no breast complaints today  Denies any bowel or bladder issues  Pt follows with PCP for regular check-ups and blood work  Review of Systems   Genitourinary: Negative        The following portions of the patient's history were reviewed and updated as appropriate: allergies, current medications, past family history, past medical history, past social history, past surgical history and problem list     Past Medical History:   Diagnosis Date    High cholesterol     Medical history reviewed with no changes        Past Surgical History:   Procedure Laterality Date    COLONOSCOPY W/ ENDOSCOPIC   2019    WISDOM TOOTH EXTRACTION  06/2021       Family History   Problem Relation Age of Onset    No Known Problems Mother     No Known Problems Father     Cancer Maternal Grandmother     Cancer Maternal Aunt         s/p partial hysterectomy but unsure type of cancer       Social History     Socioeconomic History    Marital status: Single     Spouse name: Not on file    Number of children: Not on file    Years of education: Not on file    Highest education level: Not on file   Occupational History    Not on file   Tobacco Use    Smoking status: Never Smoker    Smokeless tobacco: Never Used   Vaping Use    Vaping Use: Never used   Substance and Sexual Activity    Alcohol use: Yes     Alcohol/week: 2 0 standard drinks     Types: 2 Shots of liquor per week     Comment: social    Drug use: Never    Sexual activity: Yes     Partners: Male     Birth control/protection: OCP   Other Topics Concern    Not on file   Social History Narrative    Not on file     Social Determinants of Health     Financial Resource Strain: Not on file   Food Insecurity: Not on file   Transportation Needs: Not on file   Physical Activity: Not on file   Stress: Not on file   Social Connections: Not on file   Intimate Partner Violence: Not on file   Housing Stability: Not on file         Current Outpatient Medications:     norethindrone-ethinyl estradiol-iron (Loestrin Fe 1 5/30) 1 5-30 MG-MCG tablet, Take 1 tablet by mouth daily, Disp: 84 tablet, Rfl: 3    No Known Allergies    Objective   Vitals:    07/22/22 1057   BP: 128/78   BP Location: Right arm   Patient Position: Sitting   Cuff Size: Large   Weight: 119 kg (262 lb)     Physical Exam  Vitals and nursing note reviewed  Constitutional:       Appearance: She is well-developed  HENT:      Head: Normocephalic  Neck:      Thyroid: No thyromegaly  Trachea: No tracheal deviation  Cardiovascular:      Rate and Rhythm: Normal rate and regular rhythm  Heart sounds: Normal heart sounds  Pulmonary:      Effort: Pulmonary effort is normal       Breath sounds: Normal breath sounds  Chest:   Breasts: Breasts are symmetrical       Right: No inverted nipple, mass, nipple discharge, skin change or tenderness  Left: No inverted nipple, mass, nipple discharge, skin change or tenderness  Abdominal:      General: Bowel sounds are normal  There is no distension  Palpations: Abdomen is soft  There is no mass  Tenderness: There is no abdominal tenderness  There is no guarding or rebound  Genitourinary:     Labia:         Right: No rash, tenderness, lesion or injury  Left: No rash, tenderness, lesion or injury  Vagina: Normal       Cervix: Normal       Uterus: Normal        Adnexa: Right adnexa normal and left adnexa normal         Right: No mass, tenderness or fullness  Left: No mass, tenderness or fullness  Musculoskeletal:         General: Normal range of motion  Cervical back: Normal range of motion and neck supple  Skin:     General: Skin is warm and dry  Neurological:      Mental Status: She is alert and oriented to person, place, and time  Psychiatric:         Behavior: Behavior normal          Thought Content:  Thought content normal          Judgment: Judgment normal

## 2022-07-23 LAB
C TRACH DNA SPEC QL NAA+PROBE: NEGATIVE
N GONORRHOEA DNA SPEC QL NAA+PROBE: NEGATIVE

## 2022-10-03 ENCOUNTER — TELEPHONE (OUTPATIENT)
Dept: PSYCHIATRY | Facility: CLINIC | Age: 22
End: 2022-10-03

## 2022-10-03 NOTE — TELEPHONE ENCOUNTER
left message for pt to return call to intake in regards to scheduling for services in [facility]

## 2022-10-04 ENCOUNTER — TELEPHONE (OUTPATIENT)
Dept: PSYCHIATRY | Facility: CLINIC | Age: 22
End: 2022-10-04

## 2022-10-04 NOTE — TELEPHONE ENCOUNTER
Behavorial Health Outpatient Intake Questions    Referred by: PCP    Please advised interviewee that they need to answer all questions truthfully to allow for best care and any misrepresentations of information may affect their ability to be seen at this clinic   => Was this discussed? Yes     [facility]     Presenting Problem (in patient's words):   Psych Evaluation, severe anxiety, driving anxiety, when really overwhelmed kind of snappy or shutdown   Are there any developmental disabilities? ? If yes, can they speak to you on the phone? If they are too limited to speak to you on phone, refer out No    Are you taking any psychiatric medications? No    => If yes, who prescribes? If yes, are they injectable medications? Does the patient have a language barrier or hearing impairment? No    Have you been treated at [facility] by a therapist or a doctor in the past? If yes, who? No    Has the patient been hospitalized for mental health? No   If yes, how long ago was last hospitalization and where was it? Do you actively use alcohol or marijuana or illegal substances? If yes, what and how much - refer out to Drug and alcohol treatment if use is excessive or daily use of illegal substances No concerns of substance abuse are reported  Do you have a community treatment team or ? No    Legal History-     Does the patient have any history of arrests, USP/snf time, or DUIs? No  If Yes-  1) What types of charges? 2) When were they last incarcerated? 3) Are they currently on parole or probation? Minor Child-    Who has custody of the child? Is there a custody agreement? If there is a custody agreement remind parent that they must bring a copy to the first appt or they will not be seen       Intake Team, please check with provider before scheduling if flags come up such as:  - complex case  - legal history (other than DUI)  - communication barrier concerns are present  - if, in your judgment, this needs further review    ACCEPTED as a patient Yes  => Appointment Date: November 17, 2022 at 4:30pm with Dr Sacha He? No    Name of Insurance Co: [employer] ID# [id number]  [id number] Phone #  If ins is primary or secondary  If patient is a minor, parents information such as Name, D  O B of guarantor

## 2022-10-12 ENCOUNTER — RA CDI HCC (OUTPATIENT)
Dept: OTHER | Facility: HOSPITAL | Age: 22
End: 2022-10-12

## 2022-10-12 NOTE — PROGRESS NOTES
[facility]  coding opportunities       Chart reviewed, no opportunity found: CHART REVIEWED, NO OPPORTUNITY FOUND        Patients Insurance        Commercial Insurance: [address] [place] [address]

## 2022-10-19 ENCOUNTER — OFFICE VISIT (OUTPATIENT)
Dept: INTERNAL MEDICINE CLINIC | Facility: CLINIC | Age: 22
End: 2022-10-19
Payer: COMMERCIAL

## 2022-10-19 VITALS
DIASTOLIC BLOOD PRESSURE: 82 MMHG | HEART RATE: 82 BPM | WEIGHT: 270 LBS | SYSTOLIC BLOOD PRESSURE: 130 MMHG | OXYGEN SATURATION: 99 % | BODY MASS INDEX: 38.65 KG/M2 | TEMPERATURE: 97.9 F | RESPIRATION RATE: 14 BRPM | HEIGHT: 70 IN

## 2022-10-19 DIAGNOSIS — J30.1 SEASONAL ALLERGIC RHINITIS DUE TO POLLEN: ICD-10-CM

## 2022-10-19 DIAGNOSIS — Z01.419 ENCOUNTER FOR GYNECOLOGICAL EXAMINATION (GENERAL) (ROUTINE) WITHOUT ABNORMAL FINDINGS: ICD-10-CM

## 2022-10-19 DIAGNOSIS — E66.09 CLASS 2 OBESITY DUE TO EXCESS CALORIES WITHOUT SERIOUS COMORBIDITY WITH BODY MASS INDEX (BMI) OF 38.0 TO 38.9 IN ADULT: Primary | ICD-10-CM

## 2022-10-19 DIAGNOSIS — E78.2 MIXED HYPERLIPIDEMIA: ICD-10-CM

## 2022-10-19 PROBLEM — K21.9 GASTROESOPHAGEAL REFLUX DISEASE WITHOUT ESOPHAGITIS: Status: ACTIVE | Noted: 2022-10-19

## 2022-10-19 PROBLEM — E66.812 CLASS 2 OBESITY DUE TO EXCESS CALORIES IN ADULT: Status: ACTIVE | Noted: 2022-10-19

## 2022-10-19 PROCEDURE — 99213 OFFICE O/P EST LOW 20 MIN: CPT | Performed by: INTERNAL MEDICINE

## 2022-10-19 RX ORDER — FLUTICASONE PROPIONATE 50 MCG
1 SPRAY, SUSPENSION (ML) NASAL DAILY
Qty: 15.8 ML | Refills: 2 | Status: SHIPPED | OUTPATIENT
Start: 2022-10-19

## 2022-10-19 RX ORDER — CETIRIZINE HYDROCHLORIDE 5 MG/1
5 TABLET ORAL
Qty: 30 TABLET | Refills: 2 | Status: SHIPPED | OUTPATIENT
Start: 2022-10-19

## 2022-10-19 RX ORDER — MONTELUKAST SODIUM 10 MG/1
10 TABLET ORAL
Qty: 30 TABLET | Refills: 3 | Status: SHIPPED | OUTPATIENT
Start: 2022-10-19

## 2022-10-19 NOTE — ASSESSMENT & PLAN NOTE
Recommend low-fat low-cholesterol diet lose weight refer to weight loss sent to [facility]

## 2022-10-19 NOTE — PROGRESS NOTES
[facility] Office Visit Note  10/19/22     Sha Hensley 23 y o  female MRN: [id number]  : 2000    Assessment:     1  Seasonal allergic rhinitis due to pollen  Assessment & Plan:  Claritin in the morning singular the evening with Flonase see the response in next 2 to 3 weeks    Orders:  -     cetirizine (ZyrTEC) 5 MG tablet; Take 1 tablet (5 mg total) by mouth daily at bedtime  -     montelukast (SINGULAIR) 10 mg tablet; Take 1 tablet (10 mg total) by mouth daily after breakfast  -     fluticasone (FLONASE) 50 mcg/act nasal spray; 1 spray into each nostril daily    2  Encounter for gynecological examination (general) (routine) without abnormal findings    3  Mixed hyperlipidemia  Assessment & Plan:  Recommend low-fat low-cholesterol diet lose weight refer to weight loss sent to [facility]      4  Class 2 obesity due to excess calories without serious comorbidity with body mass index (BMI) of 38 0 to 38 9 in adult  Assessment & Plan:  Diet exercise change in life stye and cut down the portion refer to weight loss center    Orders:  -     Ambulatory Referral to Weight Management; Future          Discussion Summary and Plan: Today's care plan and medications were reviewed with patient in detail and all their questions answered to their satisfaction  Chief Complaint   Patient presents with   • Follow-up      Subjective:  Came in for follow-up of gaining weight patient is on birth control pills for contraception denies any symptoms except intermittent heartburn indigestion not able to lose weight in spite of all the measures patient counseling done for the BMI the same time was referred to weight loss center discussed the FDA approved oral medication and injectable    BMI Counseling: Body mass index is 39 3 kg/m²   The BMI is above normal  Nutrition recommendations include decreasing portion sizes, encouraging healthy choices of fruits and vegetables, decreasing fast food intake, consuming healthier snacks, limiting drinks that contain sugar, moderation in carbohydrate intake, increasing intake of lean protein, reducing intake of saturated and trans fat and reducing intake of cholesterol  Exercise recommendations include exercising 3-5 times per week  No pharmacotherapy was ordered  Patient referred to weight management  Rationale for BMI follow-up plan is due to patient being overweight or obese  Depression Screening and Follow-up Plan: Patient was screened for depression during today's encounter  They screened negative with a PHQ-2 score of 0  The following portions of the patient's history were reviewed and updated as appropriate: allergies, current medications, past family history, past medical history, past social history, past surgical history and problem list     Review of Systems   Constitutional: Negative for activity change, appetite change, chills, diaphoresis, fatigue, fever and unexpected weight change  HENT: Negative for congestion, dental problem, drooling, ear discharge, ear pain, facial swelling, hearing loss, mouth sores, nosebleeds, postnasal drip, rhinorrhea, sinus pressure, sneezing, sore throat, tinnitus, trouble swallowing and voice change  Eyes: Negative for photophobia, pain, discharge, redness, itching and visual disturbance  Respiratory: Negative for apnea, cough, choking, chest tightness, shortness of breath, wheezing and stridor  Cardiovascular: Negative for chest pain, palpitations and leg swelling  Gastrointestinal: Negative for abdominal distention, abdominal pain, anal bleeding, blood in stool, constipation, diarrhea, nausea, rectal pain and vomiting  Endocrine: Negative for cold intolerance, heat intolerance, polydipsia, polyphagia and polyuria  Genitourinary: Negative for decreased urine volume, difficulty urinating, dysuria, enuresis, flank pain, frequency, genital sores, hematuria and urgency  Musculoskeletal: Positive for arthralgias   Negative for back pain, gait problem, joint swelling, myalgias, neck pain and neck stiffness  Skin: Negative for color change, pallor, rash and wound  Allergic/Immunologic: Negative  Negative for environmental allergies, food allergies and immunocompromised state  Neurological: Negative for dizziness, tremors, seizures, syncope, facial asymmetry, speech difficulty, weakness, light-headedness, numbness and headaches  Psychiatric/Behavioral: Negative for agitation, behavioral problems, confusion, decreased concentration, dysphoric mood, hallucinations, self-injury, sleep disturbance and suicidal ideas  The patient is not nervous/anxious and is not hyperactive            Historical Information   Patient Active Problem List   Diagnosis   • Encounter for gynecological examination (general) (routine) without abnormal findings   • Seasonal allergic rhinitis due to pollen   • Mixed hyperlipidemia   • Class 2 obesity due to excess calories in adult   • Gastroesophageal reflux disease without esophagitis     Past Medical History:   Diagnosis Date   • High cholesterol    • Medical history reviewed with no changes      Past Surgical History:   Procedure Laterality Date   • COLONOSCOPY W/ ENDOSCOPIC   2019   • WISDOM TOOTH EXTRACTION  06/2021     Social History     Substance and Sexual Activity   Alcohol Use Yes   • Alcohol/week: 2 0 standard drinks   • Types: 2 Shots of liquor per week    Comment: social     Social History     Substance and Sexual Activity   Drug Use Never     Social History     Tobacco Use   Smoking Status Never Smoker   Smokeless Tobacco Never Used     Family History   Problem Relation Age of Onset   • No Known Problems Mother    • No Known Problems Father    • Cancer Maternal Grandmother    • Cancer Maternal Aunt         s/p partial hysterectomy but unsure type of cancer     Health Maintenance Due   Topic   • Hepatitis C Screening    • COVID-19 Vaccine (1)   • HPV Vaccine (1 - 2-dose series)   • HIV Screening • BMI: Followup Plan    • DTaP,Tdap,and Td Vaccines (1 - Tdap)   • Annual Physical    • Influenza Vaccine (1)      Meds/Allergies       Current Outpatient Medications:   •  cetirizine (ZyrTEC) 5 MG tablet, Take 1 tablet (5 mg total) by mouth daily at bedtime, Disp: 30 tablet, Rfl: 2  •  fluticasone (FLONASE) 50 mcg/act nasal spray, 1 spray into each nostril daily, Disp: 15 8 mL, Rfl: 2  •  montelukast (SINGULAIR) 10 mg tablet, Take 1 tablet (10 mg total) by mouth daily after breakfast, Disp: 30 tablet, Rfl: 3  •  norethindrone-ethinyl estradiol-iron (Loestrin Fe 1 5/30) 1 5-30 MG-MCG tablet, Take 1 tablet by mouth daily, Disp: 84 tablet, Rfl: 3      Objective:    Vitals:   /82   Pulse 82   Temp 97 9 °F (36 6 °C)   Resp 14   Ht 5' 9 5" (1 765 m)   Wt 122 kg (270 lb)   SpO2 99%   BMI 39 30 kg/m²   Body mass index is 39 3 kg/m²  Vitals:    10/19/22 1639   Weight: 122 kg (270 lb)       Physical Exam  Constitutional:       General: She is not in acute distress  Appearance: She is well-developed  She is not ill-appearing, toxic-appearing or diaphoretic  HENT:      Head: Normocephalic and atraumatic  Right Ear: External ear normal       Left Ear: External ear normal       Nose: Nose normal       Mouth/Throat:      Pharynx: No oropharyngeal exudate  Eyes:      General: Lids are normal  Lids are everted, no foreign bodies appreciated  No scleral icterus  Right eye: No discharge  Left eye: No discharge  Conjunctiva/sclera: Conjunctivae normal       Pupils: Pupils are equal, round, and reactive to light  Neck:      Thyroid: No thyromegaly  Vascular: Normal carotid pulses  No carotid bruit, hepatojugular reflux or JVD  Trachea: No tracheal tenderness or tracheal deviation  Cardiovascular:      Rate and Rhythm: Normal rate and regular rhythm  Pulses: Normal pulses  Heart sounds: Normal heart sounds  No murmur heard  No friction rub  No gallop  Pulmonary:      Effort: Pulmonary effort is normal  No respiratory distress  Breath sounds: Normal breath sounds  No stridor  No wheezing or rales  Chest:      Chest wall: No tenderness  Abdominal:      General: Bowel sounds are normal  There is no distension  Palpations: Abdomen is soft  There is no mass  Tenderness: There is no abdominal tenderness  There is no guarding or rebound  Musculoskeletal:         General: No tenderness or deformity  Normal range of motion  Cervical back: Normal range of motion and neck supple  No edema, erythema or rigidity  No spinous process tenderness or muscular tenderness  Normal range of motion  Lymphadenopathy:      Head:      Right side of head: No submental, submandibular, tonsillar, preauricular or posterior auricular adenopathy  Left side of head: No submental, submandibular, tonsillar, preauricular, posterior auricular or occipital adenopathy  Cervical: No cervical adenopathy  Right cervical: No superficial, deep or posterior cervical adenopathy  Left cervical: No superficial, deep or posterior cervical adenopathy  Upper Body:      Right upper body: No pectoral adenopathy  Left upper body: No pectoral adenopathy  Skin:     General: Skin is warm and dry  Coloration: Skin is not pale  Findings: No erythema or rash  Neurological:      Mental Status: She is alert and oriented to person, place, and time  Cranial Nerves: No cranial nerve deficit  Sensory: No sensory deficit  Motor: No tremor, abnormal muscle tone or seizure activity  Coordination: Coordination normal       Gait: Gait normal       Deep Tendon Reflexes: Reflexes are normal and symmetric  Reflexes normal    Psychiatric:         Behavior: Behavior normal          Thought Content: Thought content normal          Judgment: Judgment normal          Lab Review   No visits with results within 2 Month(s) from this visit     Latest known visit with results is:   Annual Exam on 07/22/2022   Component Date Value Ref Range Status   • N gonorrhoeae, DNA Probe 07/22/2022 Negative  Negative Final   • Chlamydia trachomatis, DNA Probe 07/22/2022 Negative  Negative Final         There are no Patient Instructions on file for this visit  Sowmya Wright        "This note has been constructed using a voice recognition system  Therefore there may be syntax, spelling, and/or grammatical errors   Please call if you have any questions  "

## 2022-11-11 DIAGNOSIS — J30.1 SEASONAL ALLERGIC RHINITIS DUE TO POLLEN: ICD-10-CM

## 2022-11-11 RX ORDER — MONTELUKAST SODIUM 10 MG/1
10 TABLET ORAL
Qty: 90 TABLET | Refills: 2 | Status: SHIPPED | OUTPATIENT
Start: 2022-11-11

## 2022-11-11 RX ORDER — CETIRIZINE HYDROCHLORIDE 5 MG/1
TABLET ORAL
Qty: 90 TABLET | Refills: 1 | Status: SHIPPED | OUTPATIENT
Start: 2022-11-11

## 2022-11-15 ENCOUNTER — TELEPHONE (OUTPATIENT)
Dept: PSYCHIATRY | Facility: CLINIC | Age: 22
End: 2022-11-15

## 2022-11-15 NOTE — TELEPHONE ENCOUNTER
Contacted patient to get intake appt rescheduled due to Dr Michael Brown out of the office  Asked Patient to call back to reschedule

## 2022-12-11 DIAGNOSIS — J30.1 SEASONAL ALLERGIC RHINITIS DUE TO POLLEN: ICD-10-CM

## 2022-12-12 RX ORDER — FLUTICASONE PROPIONATE 50 MCG
SPRAY, SUSPENSION (ML) NASAL
Qty: 24 ML | Refills: 2 | Status: SHIPPED | OUTPATIENT
Start: 2022-12-12

## 2022-12-20 NOTE — BH TREATMENT PLAN
TREATMENT PLAN (Medication Management Only)        [facility]    Name and Date of Birth:  Gerhardt Mallow 23 y o  2000  Date of Treatment Plan: December 20, 2022  Diagnosis/Diagnoses:  No diagnosis found  Strengths/Personal Resources for Self-Care: ability to adapt to life changes, ability to communicate needs, ability to communicate well  Area/Areas of need (in own words): anxiety symptoms  1  Long Term Goal: improve control of anxiety  Target Date:6 months - 6/22/2023  Person/Persons responsible for completion of goal: Denasia  2  Short Term Objective (s) - How will we reach this goal?:   A  Provider new recommended medication/dosage changes and/or continue medication(s): start zoloft  B  N/A   C  N/A  Target Date:6 months - 6/22/2023  Person/Persons Responsible for Completion of Goal: Denasia  Progress Towards Goals: initiating treatment  Treatment Modality: medication management every 3 months  Review due 180 days from date of this plan: 6 months - 6/22/2023  Expected length of service: ongoing treatment  My Physician/PA/NP and I have developed this plan together and I agree to work on the goals and objectives  I understand the treatment goals that were developed for my treatment
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
## 2022-12-22 ENCOUNTER — OFFICE VISIT (OUTPATIENT)
Dept: PSYCHIATRY | Facility: CLINIC | Age: 22
End: 2022-12-22

## 2022-12-22 DIAGNOSIS — F41.1 GAD (GENERALIZED ANXIETY DISORDER): Primary | ICD-10-CM

## 2022-12-22 RX ORDER — SERTRALINE HYDROCHLORIDE 25 MG/1
25 TABLET, FILM COATED ORAL DAILY
Qty: 30 TABLET | Refills: 1 | Status: SHIPPED | OUTPATIENT
Start: 2022-12-22

## 2023-01-09 ENCOUNTER — HOSPITAL ENCOUNTER (EMERGENCY)
Facility: HOSPITAL | Age: 23
Discharge: HOME | End: 2023-01-09
Attending: EMERGENCY MEDICINE

## 2023-01-09 VITALS
WEIGHT: 270.06 LBS | HEART RATE: 86 BPM | OXYGEN SATURATION: 98 % | HEIGHT: 69 IN | DIASTOLIC BLOOD PRESSURE: 58 MMHG | RESPIRATION RATE: 18 BRPM | TEMPERATURE: 98.4 F | SYSTOLIC BLOOD PRESSURE: 122 MMHG | BODY MASS INDEX: 40 KG/M2

## 2023-01-09 DIAGNOSIS — M54.50 ACUTE RIGHT-SIDED LOW BACK PAIN WITHOUT SCIATICA: Primary | ICD-10-CM

## 2023-01-09 LAB
BILIRUB UR QL STRIP: NEGATIVE
CLARITY UR: CLEAR
COLOR UR: YELLOW
EXT PREGNANCY TEST URINE: NEGATIVE
EXT. CONTROL: NORMAL
GLUCOSE UR STRIP-MCNC: NEGATIVE MG/DL
HGB UR QL STRIP.AUTO: NEGATIVE
KETONES UR STRIP-MCNC: NEGATIVE MG/DL
LEUKOCYTE ESTERASE UR QL STRIP: NEGATIVE
NITRITE UR QL STRIP: NEGATIVE
PH UR STRIP.AUTO: 6 [PH]
PROT UR STRIP-MCNC: NEGATIVE MG/DL
SP GR UR STRIP.AUTO: >=1.03 (ref 1–1.03)
UROBILINOGEN UR QL STRIP.AUTO: 0.2 E.U./DL

## 2023-01-09 RX ORDER — LIDOCAINE 50 MG/G
1 PATCH TOPICAL ONCE
Status: DISCONTINUED | OUTPATIENT
Start: 2023-01-09 | End: 2023-01-09 | Stop reason: HOSPADM

## 2023-01-09 RX ORDER — IBUPROFEN 600 MG/1
600 TABLET ORAL EVERY 6 HOURS PRN
Qty: 30 TABLET | Refills: 0 | Status: SHIPPED | OUTPATIENT
Start: 2023-01-09

## 2023-01-09 RX ORDER — KETOROLAC TROMETHAMINE 30 MG/ML
30 INJECTION, SOLUTION INTRAMUSCULAR; INTRAVENOUS ONCE
Status: COMPLETED | OUTPATIENT
Start: 2023-01-09 | End: 2023-01-09

## 2023-01-09 RX ADMIN — LIDOCAINE 5% 1 PATCH: 700 PATCH TOPICAL at 17:52

## 2023-01-09 RX ADMIN — KETOROLAC TROMETHAMINE 30 MG: 30 INJECTION, SOLUTION INTRAMUSCULAR at 17:52

## 2023-01-09 NOTE — ED PROVIDER NOTES
History  Chief Complaint   Patient presents with   • Back Pain     Reports low back pain since "before Christmas", denies other symptoms, denies injury, taking Tylenol     25-year-old female presents to the emergency department for evaluation of back pain  The patient reports that she has been having intermittent episodes of right sided lower back pain over the past several weeks  She describes it as a sharp and stabbing pain with intermittent radiation down her right leg  She has been taking Tylenol to treat her symptoms but states that she has not been using it regularly  Reports that the last time she took Tylenol was 2 days ago  Denies fevers, chills, sweats  No saddle anesthesia  Full strength and sensation bilateral lower extremities  No loss of bowel or bladder function  Denies IV drug use  Denies history of cancer  Denies direct trauma  No unexpected weight loss  No long term steroid use  No pulsatile abdominal mass  No hematuria  No HIV, Transplant or systemic corticosteroids  No midline tenderness  Prior to Admission Medications   Prescriptions Last Dose Informant Patient Reported? Taking?    cetirizine (ZyrTEC) 5 MG tablet 1/8/2023  No Yes   Sig: TAKE 1 TABLET BY MOUTH AT BEDTIME   fluticasone (FLONASE) 50 mcg/act nasal spray 1/9/2023  No Yes   Sig: SPRAY 1 SPRAY INTO EACH NOSTRIL EVERY DAY   montelukast (SINGULAIR) 10 mg tablet 1/9/2023  No Yes   Sig: TAKE 1 TABLET (10 MG TOTAL) BY MOUTH DAILY AFTER BREAKFAST   norethindrone-ethinyl estradiol-iron (Loestrin Fe 1 5/30) 1 5-30 MG-MCG tablet 1/9/2023  No Yes   Sig: Take 1 tablet by mouth daily   sertraline (Zoloft) 25 mg tablet Not Taking  No No   Sig: Take 1 tablet (25 mg total) by mouth daily   Patient not taking: Reported on 1/9/2023      Facility-Administered Medications: None       Past Medical History:   Diagnosis Date   • High cholesterol    • Medical history reviewed with no changes        Past Surgical History:   Procedure Laterality Date   • COLONOSCOPY W/ ENDOSCOPIC   2019   • WISDOM TOOTH EXTRACTION  06/2021       Family History   Problem Relation Age of Onset   • No Known Problems Mother    • No Known Problems Father    • Cancer Maternal Grandmother    • Cancer Maternal Aunt         s/p partial hysterectomy but unsure type of cancer     I have reviewed and agree with the history as documented  E-Cigarette/Vaping   • E-Cigarette Use Never User      E-Cigarette/Vaping Substances   • Nicotine No    • THC No    • CBD No    • Flavoring No    • Other No    • Unknown No      Social History     Tobacco Use   • Smoking status: Never   • Smokeless tobacco: Never   Vaping Use   • Vaping Use: Never used   Substance Use Topics   • Alcohol use: Yes     Alcohol/week: 2 0 standard drinks     Types: 2 Shots of liquor per week     Comment: social   • Drug use: Never       Review of Systems   Constitutional: Negative for chills and fever  HENT: Negative for ear pain and sore throat  Eyes: Negative for pain and visual disturbance  Respiratory: Negative for cough and shortness of breath  Cardiovascular: Negative for chest pain and palpitations  Gastrointestinal: Negative for abdominal pain and vomiting  Genitourinary: Negative for dysuria and hematuria  Musculoskeletal: Positive for back pain  Negative for arthralgias  Skin: Negative for color change and rash  Neurological: Negative for seizures and syncope  All other systems reviewed and are negative  Physical Exam  Physical Exam  Vitals and nursing note reviewed  Constitutional:       General: She is not in acute distress  Appearance: She is well-developed  HENT:      Head: Normocephalic and atraumatic  Right Ear: External ear normal       Left Ear: External ear normal       Mouth/Throat:      Mouth: Mucous membranes are moist    Eyes:      Conjunctiva/sclera: Conjunctivae normal    Cardiovascular:      Rate and Rhythm: Normal rate and regular rhythm        Heart sounds: No murmur heard  Pulmonary:      Effort: Pulmonary effort is normal  No respiratory distress  Breath sounds: Normal breath sounds  Abdominal:      Palpations: Abdomen is soft  Tenderness: There is no abdominal tenderness  Musculoskeletal:         General: Tenderness present  No swelling  Cervical back: Normal, normal range of motion and neck supple  Thoracic back: Normal       Lumbar back: Tenderness present  No bony tenderness  Negative right straight leg raise test and negative left straight leg raise test         Back:    Skin:     General: Skin is warm and dry  Capillary Refill: Capillary refill takes less than 2 seconds  Neurological:      Mental Status: She is alert  Sensory: Sensation is intact  Motor: Motor function is intact  Gait: Gait is intact     Psychiatric:         Mood and Affect: Mood normal          Behavior: Behavior normal          Vital Signs  ED Triage Vitals [01/09/23 1722]   Temperature Pulse Respirations Blood Pressure SpO2   98 4 °F (36 9 °C) 86 18 122/58 98 %      Temp Source Heart Rate Source Patient Position - Orthostatic VS BP Location FiO2 (%)   Oral Monitor Sitting Left arm --      Pain Score       8           Vitals:    01/09/23 1722   BP: 122/58   Pulse: 86   Patient Position - Orthostatic VS: Sitting         Visual Acuity      ED Medications  Medications   lidocaine (LIDODERM) 5 % patch 1 patch (1 patch Topical Medication Applied 1/9/23 1752)   ketorolac (TORADOL) injection 30 mg (30 mg Intramuscular Given 1/9/23 1752)       Diagnostic Studies  Results Reviewed     Procedure Component Value Units Date/Time    UA w Reflex to Microscopic w Reflex to Culture [[id number]]  (Normal) Collected: 01/09/23 1748    Lab Status: Final result Specimen: Urine, Clean Catch Updated: 01/09/23 1759     Color, UA Yellow     Clarity, UA Clear     Specific Gravity, UA >=1 030     pH, UA 6 0     Leukocytes, UA Negative     Nitrite, UA Negative Protein, UA Negative mg/dl      Glucose, UA Negative mg/dl      Ketones, UA Negative mg/dl      Urobilinogen, UA 0 2 E U /dl      Bilirubin, UA Negative     Occult Blood, UA Negative    POCT pregnancy, urine [[id number]]  (Normal) Resulted: 01/09/23 1752    Lab Status: Final result Updated: 01/09/23 1752     EXT Preg Test, Ur Negative     Control Valid                 No orders to display              Procedures  Procedures         ED Course                               SBIRT 20yo+    Flowsheet Row Most Recent Value   SBIRT (23 yo +)    In order to provide better care to our patients, we are screening all of our patients for alcohol and drug use  Would it be okay to ask you these screening questions? No Filed at: 01/09/2023 1724                    Medical Decision Making  25-year-old female presented to the emergency department for evaluation of lower back pain  On arrival the patient was awake, alert, oriented and in no acute distress  No red flag signs on exam   Pregnancy test was negative  Urinalysis not consistent with a urinary tract infection  No blood on UA either  Patient treated symptomatically with analgesic medications  On reevaluation the patient reported some improvement of her symptoms  The patient is appropriate for discharge at this time  An ambulatory referral was placed with comprehensive spine  The patient was given a prescription for Motrin  Return precautions were discussed  Patient agrees with the plan for discharge and feels comfortable to go home with proper f/u  Advised to return for worsening or additional problems  Diagnostic tests were reviewed and questions answered  Diagnosis, care plan and treatment options were discussed  The patient understands instructions and will follow up as directed  Acute right-sided low back pain without sciatica: acute illness or injury  Amount and/or Complexity of Data Reviewed  Labs: ordered        Risk  Prescription drug management  Disposition  Final diagnoses:   Acute right-sided low back pain without sciatica     Time reflects when diagnosis was documented in both MDM as applicable and the Disposition within this note     Time User Action Codes Description Comment    1/9/2023  6:14 PM Margarette Foss Add [M54 50] Acute right-sided low back pain without sciatica       ED Disposition     ED Disposition   Discharge    Condition   Stable    Date/Time   Mon Jan 9, 2023  6:14 PM    Comment   Beny Myers discharge to home/self care                 Follow-up Information     Follow up With Specialties Details Why Contact Info Additional Information    Judge Angelina MD Internal Medicine Schedule an appointment as soon as possible for a visit   [address] [facility] [[email or web address]] [place] [phone]       [address] [facility] Emergency Department Emergency Medicine Go to  If symptoms worsen [address] [facility],[address] [id number]  [address] [place] [address] Emergency Department, [address], [address] [place] [address] [facility] Physical Therapy Schedule an appointment as soon as possible for a visit   [phone]          Discharge Medication List as of 1/9/2023  6:17 PM      START taking these medications    Details   ibuprofen (MOTRIN) 600 mg tablet Take 1 tablet (600 mg total) by mouth every 6 (six) hours as needed for mild pain, Starting Mon 1/9/2023, Normal         CONTINUE these medications which have NOT CHANGED    Details   cetirizine (ZyrTEC) 5 MG tablet TAKE 1 TABLET BY MOUTH AT BEDTIME, Normal      fluticasone (FLONASE) 50 mcg/act nasal spray SPRAY 1 SPRAY INTO EACH NOSTRIL EVERY DAY, Normal      montelukast (SINGULAIR) 10 mg tablet TAKE 1 TABLET (10 MG TOTAL) BY MOUTH DAILY AFTER BREAKFAST, Starting Fri 11/11/2022, Normal      norethindrone-ethinyl estradiol-iron (Loestrin Fe 1 5/30) 1 5-30 MG-MCG tablet Take 1 tablet by mouth daily, Starting Fri 7/22/2022, Normal      sertraline (Zoloft) 25 mg tablet Take 1 tablet (25 mg total) by mouth daily, Starting Thu 12/22/2022, Normal                 PDMP Review     None          ED Provider  Electronically Signed by           Opal France MD  01/09/23 5911

## 2023-01-09 NOTE — DISCHARGE INSTRUCTIONS
As discussed follow-up with [facility]  A prescription for Motrin was sent to your pharmacy  You can also purchase Salonpas over-the-counter for symptom relief

## 2023-01-10 ENCOUNTER — OFFICE VISIT (OUTPATIENT)
Dept: BARIATRICS | Facility: CLINIC | Age: 23
End: 2023-01-10

## 2023-01-10 VITALS
HEIGHT: 70 IN | WEIGHT: 272.2 LBS | HEART RATE: 85 BPM | SYSTOLIC BLOOD PRESSURE: 124 MMHG | DIASTOLIC BLOOD PRESSURE: 78 MMHG | BODY MASS INDEX: 38.97 KG/M2

## 2023-01-10 DIAGNOSIS — E66.9 CLASS 2 OBESITY WITH BODY MASS INDEX (BMI) OF 39.0 TO 39.9 IN ADULT: ICD-10-CM

## 2023-01-10 NOTE — PROGRESS NOTES
Assessment/Plan:  Howie Honeycutt was seen today for consult  Diagnoses and all orders for this visit:    Class 2 obesity with body mass index (BMI) of 39 0 to 39 9 in adult  -     Ambulatory Referral to Weight Management    Patient is going to pursue conservative program   I have advised meeting with the dietitian and she was in agreement  I have advised meeting with the  to help with stress eating and she was in agreement  Patient did express interest in injection medication to help with weight loss  Medication reviewed  Patient wishes to hold off at this time to see if she can have weight loss following visit with dietitian and   I did inform patient that if she starts to struggle and wants to pursue medication she is more than welcome to come back to the office sooner     - Discussed options of Healthy[facility]-Intensive Lifestyle Intervention Program, Very Low Calorie Diet-VLCD and Conservative Program and the role of weight loss medications  - Explained the importance of making lifestyle changes first before starting any anti-obesity medications  Patient should demonstrate lifestyle changes first before anti-obesity medication can be initiated  - Patient is interested in pursuing Conservative Program  - Initial weight loss goal of 5-10% weight loss for improved health  - Weight loss can improve patient's co-morbid conditions and/or prevent weight-related complications  Goals:  Do not skip any meals! Food log (ie ) www myfitnesspal com,sparkpeople  com,loseit com,calorieking  com,etc  baritastic (use skinnytaste  com, dietdoctor  com or smartphone domenica Barnacle for recipes)  No sugary beverages  At least 64oz of water daily  Increase physical activity by 10 minutes daily   Gradually increase physical activity to a goal of 5 days per week for 30 minutes of MODERATE intensity PLUS 2 days per week of FULL BODY resistance training (use smartphone apps Fastnote, etc )  Meet with Dietician  Meet with     Total time spent: 30 min, with >50% face-to-face time spent counseling patient on nonsurgical interventions for the treatment of excess weight  Discussed in detail nonsurgical options including intensive lifestyle intervention program, very low-calorie diet program and conservative program   Discussed the role of weight loss medications  Counseled patient on diet behavior and exercise modification for weight loss  Follow up in approximately 3 months with Non-Surgical Physician/Advanced Practitioner  Subjective:   Chief Complaint   Patient presents with   • Consult     Pt is here for MWM consult  Patient ID: Ольга Calixto  is a 23 y o  female with excess weight/obesity here to pursue weight management  Previous notes and records have been reviewed  Past Medical History:   Diagnosis Date   • High cholesterol    • Medical history reviewed with no changes      Past Surgical History:   Procedure Laterality Date   • COLONOSCOPY W/ ENDOSCOPIC US  2019   • WISDOM TOOTH EXTRACTION  06/2021       HPI:  Patient presents for medical weight management consultation  She is not interested in surgical intervention  Admits to being a stress eater  Would be interested in injection medication to help with weight loss  Wt Readings from Last 20 Encounters:   01/10/23 123 kg (272 lb 3 2 oz)   01/09/23 123 kg (270 lb 1 oz)   10/19/22 122 kg (270 lb)   07/22/22 119 kg (262 lb)   04/01/22 125 kg (275 lb)   11/10/21 122 kg (270 lb)   07/21/21 123 kg (270 lb 6 4 oz)   07/13/20 112 kg (247 lb)   11/22/19 109 kg (241 lb)   11/15/19 108 kg (238 lb 12 1 oz)   10/04/19 108 kg (237 lb 12 8 oz)   09/13/13 90 9 kg (200 lb 6 1 oz) (98 %, Z= 2 15)*     * Growth percentiles are based on CDC (Girls, 2-20 Years) data       Obesity/Excess Weight:  Severity: Moderate  Onset:  lifelong    Modifiers: Diet and Exercise and Over the Counter Weight Loss Supplements  Contributing factors: Poor Food Choices, Insufficient Physical Activity and Stress/Emotional Eating  Associated symptoms: comorbid conditions    B- skips  S- no  L- eggplant parm and pasta or sandwich or leftovers  S- no  D- chili or chicken, rice and greenbeans or pizza or subs or other takeout  S- occ chips or doritos    Hydration: 2 bottles water daily, 1-2 glasses SF [place] juice   Alcohol: 2-4 drinks /wk  Smoking: no  Exercise: walking throughout the day  Occupation: Community   Sleep: 6-8hrs  STOP ban/8    The following portions of the patient's history were reviewed and updated as appropriate: allergies, current medications, past family history, past medical history, past social history, past surgical history, and problem list     Review of Systems   Constitutional: Negative for fever  Respiratory: Negative for shortness of breath  Cardiovascular: Negative for chest pain  Gastrointestinal: Negative for abdominal pain and blood in stool  Genitourinary: Negative for dysuria and hematuria  Musculoskeletal: Positive for back pain  Negative for arthralgias and myalgias  Skin: Negative for rash  Neurological: Negative for headaches  Psychiatric/Behavioral: Negative for dysphoric mood and suicidal ideas  The patient is nervous/anxious  Objective:  /78   Pulse 85   Ht 5' 9 5" (1 765 m)   Wt 123 kg (272 lb 3 2 oz)   LMP 2022   BMI 39 62 kg/m²   Constitutional: Well-developed, well-nourished and Obese Body mass index is 39 62 kg/m²  Jack Lewis HEENT: No conjunctival injection  Pulmonary: No increased work of breathing or signs of respiratory distress  CV: Well-perfused  GI: Obese  Non-distended  MSK: No edema   Neuro: Oriented to person, place and time  Normal Speech  Normal gait  Psych: Normal affect and mood  Labs and Imaging  Recent labs and imaging have been personally reviewed    No results found for: WBC, HGB, HCT, MCV, PLT  Lab Results   Component Value Date    SODIUM 139 10/11/2019    K 4 4 10/11/2019     10/11/2019    CO2 30 10/11/2019    AGAP 5 10/11/2019    BUN 12 10/11/2019    CREATININE 0 78 10/11/2019    GLUC 72 10/11/2019    CALCIUM 9 1 10/11/2019    AST 14 10/11/2019    ALT 23 10/11/2019    ALKPHOS 72 10/11/2019    TP 8 0 10/11/2019    TBILI 0 50 10/11/2019    EGFR 109 10/11/2019     No results found for: HGBA1C  Lab Results   Component Value Date    QIL8JVTAOFWA 1 388 10/11/2019     No results found for: CHOLESTEROL  No results found for: HDL  No results found for: TRIG  No results found for: [facility]

## 2023-01-10 NOTE — PATIENT INSTRUCTIONS
Goals:  Do not skip any meals! Food log (ie ) www myfitnesspal com,sparkpeople  com,loseit com,calorieking  com,etc  baritastic (use skinnytaste  com, dietdoctor  com or smartphone dom[employer]ica [employer] for recipes)  No sugary beverages  At least 64oz of water daily  Increase physical activity by 10 minutes daily   Gradually increase physical activity to a goal of 5 days per week for 30 minutes of MODERATE intensity PLUS 2 days per week of FULL BODY resistance training (use smartphone apps CloudPhysics, etc )  Meet with Dietician  Meet with

## 2023-01-12 ENCOUNTER — TELEPHONE (OUTPATIENT)
Dept: PHYSICAL THERAPY | Facility: OTHER | Age: 23
End: 2023-01-12

## 2023-01-15 DIAGNOSIS — F41.1 GAD (GENERALIZED ANXIETY DISORDER): ICD-10-CM

## 2023-01-15 RX ORDER — SERTRALINE HYDROCHLORIDE 25 MG/1
25 TABLET, FILM COATED ORAL DAILY
Qty: 90 TABLET | Refills: 1 | Status: SHIPPED | OUTPATIENT
Start: 2023-01-15

## 2023-01-18 ENCOUNTER — NURSE TRIAGE (OUTPATIENT)
Dept: PHYSICAL THERAPY | Facility: OTHER | Age: 23
End: 2023-01-18

## 2023-02-16 NOTE — PROGRESS NOTES
Weight Management Medical Nutrition Assessment  Yael Campos is here today for menu planning  Current weight 271#  Patient was skipping breakfast until recently and would not have a meal until 12/1pm which resulted in larger portions and rebound hunger  Patient prefers savory meals over sweets and snacking but may snack after dinner  Patient is not focused on weight loss but would like to improve her health and dietary intake as she was notified that her cholesterol was elevated  She is looking to discuss healthier food choices  Discussed benefits of interval eating, adequate protein intake, and mindful eating  Discussed sources of saturated fats and benefits of consuming lean protein sources  Encouraged patient to identify movement she enjoys as the gym is not her preferred exercise, despite her having a membership  Discussed benefits of reducing added sugar  Patient is seeing SW today  Encouraged her to discuss her desire to utilize food as a coping mechanism and increase mindful eating techniques  Will f/u with NELDA CHING x 2 months  Discussed RD f/u options  RD card provided       Likes: eggs, nuts, peanut butter, Greek yogurt, cottage cheese, hummus, tuna     Patient seen by Medical Provider in past 6 months:  yes  Requested to schedule appointment with Medical Provider: No    Anthropometric Measurements  Start Weight (#): 271# (23)  Current Weight (#): n/a  TBW % Change from start weight: n/a  Ideal Body Weight (#): 147 5# (69 5")  Goal Weight (#): "consume healthier food items" LT#    Weight Loss History  Previous weight loss attempts: OTC meds/supplements, Exercise  Self Created Diets (Portion Control, Healthy Food Choices, etc )    Food and Nutrition Related History  Wake up: 5:45 am   Bed Time: 10:30 pm    Dietary Recall  Breakfast (8-9 am): -- Or banana Or more recently, breakfast burrito (eggs w/ sausage peppers + onion + hashbrowns)  Snack: --  Lunch (12:30 pm): dinner leftovers Or sandwich w/ cheese stick + applesauce + hummus cucumbers  Snack: may graze but not a big snacker (had a chocolate milkshake phase)  Dinner (5-7 pm): last night was buffalo chicken stuffed peppers Or jelly meatballs w/ green beans + rice Or typically protein/veggie/carb  Snack: chips w/ dip     Beverages: water/seltzer water, juice (1-2 glasses SF juice with dinner), regular soda (occasionally), OJ (occasionally) and alcohol (2-4 drinks/week)  Volume of beverage intake: 32 oz water     Weekends: Worse, more socializing (dining out/alcohol)  Cravings: asian food (savory)   Trouble area of day: in am (hungry) and in evening (struggles with portions)     Frequency of Eating out: 3-4x/week (mainly weekends)  Food restrictions: none  Cooking: self and boyfriend   Food Shopping: self and boyfriend     Occupation: Community     Physical Activity  Activity: No structured routine [enjoys walking, dancing, swimming]   Frequency:infrequently  Physical limitations/barriers to exercise: SOB     Estimated Needs  [place] [employer] Needs (needs at 272  2#)  BMR: 2,057 kcal  Maintenance calories (sedentary): 2,468 kcal  1-2# loss weekly sedentary: 6,756-5,117 kcal  1-2# loss weekly lightly active: 1,828-2,328 kcal    Protein:  grams (1 2-1 5g/kg IBW)  Fluid: 78 ounces (35mL/kg IBW)    Nutrition Diagnosis  Yes; Overweight/obesity related to Excess energy intake as evidenced by BMI more than normative standard for age and sex (obesity-grade II 35-39  9)     Nutrition Intervention    Nutrition Prescription  Calories: 1,600-1,900 kcal  Protein:  g  Fluid: 78+ ounces    Meal Plan (Cirilo/Pro)  Breakfast: 350-400/15-25  Snack: 100-150/5+  Lunch: 400-500/25-30  Snack: 100-150/5+  Dinner: 500-550/30-35  Snack: 100-150/5+    Nutrition Education  Calorie controlled menu  Lean protein food choices  Healthy snack options  Food journaling tips    Nutrition Counseling  Strategies: meal planning, portion sizes, healthy snack choices, hydration, fiber intake, protein intake, exercise, food logging    Monitoring and Evaluation:    Evaluation criteria  Energy Intake  Meet protein needs  Maintain adequate hydration  Monitor weekly weight  Meal planning/preparation  Food journal   Decreased portions at mealtimes and snacks  Physical activity     Barriers to learning:none  Readiness to change: Preparation:  (Getting ready to change)  and Action:  (Changing behavior)  Comprehension: good  Expected Compliance: good

## 2023-02-17 ENCOUNTER — OFFICE VISIT (OUTPATIENT)
Dept: BARIATRICS | Facility: CLINIC | Age: 23
End: 2023-02-17

## 2023-02-17 VITALS — WEIGHT: 271 LBS | BODY MASS INDEX: 38.8 KG/M2 | HEIGHT: 70 IN

## 2023-02-17 VITALS — BODY MASS INDEX: 39.45 KG/M2 | WEIGHT: 271 LBS

## 2023-02-17 DIAGNOSIS — R63.5 ABNORMAL WEIGHT GAIN: Primary | ICD-10-CM

## 2023-02-17 NOTE — PROGRESS NOTES
Patient presents for [facility] Evaluation as a part of Medical Weight Management Program, current weight 271lbs  Patient recognizes that she is an emotional eater and that when she experiences cravings she feels she has to give in to them or she will obsess about them  She often craves  cuisine, if she doesn't eat it at one meal she will think about it until her next meal and then indulge  She eats past full but doesn't believes she eats quickly, she is distracted by watching TV  Discussed the impact that food can have on soothing mood, the brain chemistry involved in having highly processed foods and enquired about what stressors may be triggering her  She identifies that her work and the relationship with her boyfriend are quite stressful  She is seeing a mental health prescriber for her medications but is hesitant to start the Zoloft that was prescribed because of her mom's bad experience with psychotropic meds  Patient feels she's gotten a good education about what the medications can do, encouraged patient to follow what she feels is best for her but to consider connecting to a therapist as well to process the stressors  Discussed coping skills to help her manage, she does try to go for walks or listen to her podcasts  Meditation was also suggested to help with racing thoughts as well as using an eating meditation to help with mindfulness  Encouraged patient to practice stop, think, act when reaching for food, find healthier alteratives and doing more journaling to tune into moods and emotions that could be prompting excessive eating  Goals:    - if craving certain foods practice stop, think, act, slow down impulse to use food to soothe    - consider journaling to gain insight into stressors that could lead to impulsive eating  - look at podcasts that offer meditation, eating meditations  - be present for eating experience, try not to multitask when eating  - speak with employers HR department about Employee Assistance Program for therapeutic services  - make contact with insurance about mental health coverage and providers in network  Next Appointment:  With Dr Cristi Diaz 4/14

## 2023-02-17 NOTE — PATIENT INSTRUCTIONS
- if craving certain foods practice stop, think, act, slow down impulse to use food to soothe    - consider journaling to gain insight into stressors that could lead to impulsive eating  - look at podcasts that offer meditation, eating meditations  - be present for eating experience, try not to multitask when eating  - speak with employers HR department about [address] for therapeutic services  - make contact with insurance about mental health coverage and providers in network

## 2023-04-07 DIAGNOSIS — Z12.11 SCREENING FOR COLON CANCER: Primary | ICD-10-CM

## 2023-05-26 ENCOUNTER — TELEPHONE (OUTPATIENT)
Dept: PSYCHIATRY | Facility: CLINIC | Age: 23
End: 2023-05-26

## 2023-06-07 ENCOUNTER — TELEPHONE (OUTPATIENT)
Dept: PSYCHIATRY | Facility: CLINIC | Age: 23
End: 2023-06-07

## 2023-08-04 NOTE — PROGRESS NOTES
Assessment/Plan   Problem List Items Addressed This Visit    None  Visit Diagnoses     Well woman exam    -  Primary    Relevant Orders    Liquid-based pap, screening    Routine screening for STI (sexually transmitted infection)        Relevant Orders    Chlamydia/GC amplified DNA by PCR    Trichomonas vaginalis Thin prep    Encounter for other general counseling or advice on contraception        Relevant Medications    norethindrone-ethinyl estradiol-iron (Loestrin Fe 1.5/30) 1.5-30 MG-MCG tablet          Discussion  I have discussed the importance of monthly self-breast exams, exercise and healthy diet. Encourage safe sexual practices; STI testing - desires  Contraception - LICHA    The current ASCCP guidelines were reviewed. Patient's last pap was 2020 and therefore, a pap with HPV cotesting is indicated at this time. All questions have been answered to her satisfaction  RTO for APE or sooner if needed    Subjective     HPI   Rose Manzano is a 20 y.o. female who presents for annual well woman exam.     Menarche: 4; LMP - 07/17/23; Periods are reg q 28 days and last  4-7 days; No excessive bleeding; No intermenstrual bleeding or spotting; Cramps are tolerable. No vulvar itch/burn; No vaginal itch/burn; No abn discharge or odor; No urinary sx - burning/pain/frequency/hematuria    (-) SBEs - no breast concerns; no family history of breast CA. No abd/pelvic pain or HAs;     Pt is sexually active in a mutually monog sexual relationship; No issues with intercourse;  Feels safe at home. Current contraception: LICHA    Gardasil - completed series    (+) PCP for routine Bw/care; Last Pap - 2020 WNL      Review of Systems   Constitutional: Negative for fatigue. Eyes: Negative for photophobia and visual disturbance. Respiratory: Negative for cough and shortness of breath. Cardiovascular: Negative for chest pain and palpitations.    Gastrointestinal: Negative for abdominal pain, blood in stool, constipation, diarrhea, nausea and rectal pain. Genitourinary: Negative for dyspareunia, dysuria, flank pain, frequency, genital sores, menstrual problem, pelvic pain, urgency, vaginal bleeding, vaginal discharge and vaginal pain. Musculoskeletal: Negative for arthralgias and back pain. Skin: Negative for rash. Neurological: Negative for weakness and headaches. The following portions of the patient's history were reviewed and updated as appropriate: allergies, current medications, past family history, past medical history, past social history, past surgical history and problem list.         OB History        0    Para   0    Term   0       0    AB   0    Living   0       SAB   0    IAB   0    Ectopic   0    Multiple   0    Live Births   0                 Past Medical History:   Diagnosis Date   • High cholesterol    • Medical history reviewed with no changes        Past Surgical History:   Procedure Laterality Date   • COLONOSCOPY W/ ENDOSCOPIC      • WISDOM TOOTH EXTRACTION  2021       Family History   Problem Relation Age of Onset   • No Known Problems Mother    • No Known Problems Father    • Cancer Maternal Grandmother    • Cancer Maternal Aunt         s/p partial hysterectomy but unsure type of cancer       Social History     Socioeconomic History   • Marital status: Single     Spouse name: Not on file   • Number of children: Not on file   • Years of education: Not on file   • Highest education level: Not on file   Occupational History   • Not on file   Tobacco Use   • Smoking status: Never   • Smokeless tobacco: Never   Vaping Use   • Vaping Use: Never used   Substance and Sexual Activity   • Alcohol use:  Yes     Alcohol/week: 1.0 standard drink of alcohol     Types: 1 Glasses of wine per week     Comment: Casual alcohol assumption   • Drug use: Never   • Sexual activity: Yes     Partners: Male     Birth control/protection: OCP   Other Topics Concern   • Not on file Social History Narrative   • Not on file     Social Determinants of Health     Financial Resource Strain: Not on file   Food Insecurity: Not on file   Transportation Needs: Not on file   Physical Activity: Not on file   Stress: Not on file   Social Connections: Not on file   Intimate Partner Violence: Not on file   Housing Stability: Not on file         Current Outpatient Medications:   •  ibuprofen (MOTRIN) 600 mg tablet, Take 1 tablet (600 mg total) by mouth every 6 (six) hours as needed for mild pain, Disp: 30 tablet, Rfl: 0  •  montelukast (SINGULAIR) 10 mg tablet, TAKE 1 TABLET (10 MG TOTAL) BY MOUTH DAILY AFTER BREAKFAST, Disp: 90 tablet, Rfl: 2  •  norethindrone-ethinyl estradiol-iron (Loestrin Fe 1.5/30) 1.5-30 MG-MCG tablet, Take 1 tablet by mouth daily, Disp: 84 tablet, Rfl: 3  •  cetirizine (ZyrTEC) 5 MG tablet, TAKE 1 TABLET BY MOUTH AT BEDTIME (Patient not taking: Reported on 8/7/2023), Disp: 90 tablet, Rfl: 1  •  fluticasone (FLONASE) 50 mcg/act nasal spray, SPRAY 1 SPRAY INTO EACH NOSTRIL EVERY DAY, Disp: 24 mL, Rfl: 2  •  sertraline (ZOLOFT) 25 mg tablet, TAKE 1 TABLET (25 MG TOTAL) BY MOUTH DAILY. , Disp: 90 tablet, Rfl: 1    No Known Allergies    Objective   Vitals:    08/07/23 1330   BP: 128/82   BP Location: Left arm   Patient Position: Sitting   Cuff Size: Large   Weight: 127 kg (279 lb 6.4 oz)   Height: 5' 9.5" (1.765 m)     Physical Exam  Vitals and nursing note reviewed. Constitutional:       Appearance: Normal appearance. She is well-developed and normal weight. HENT:      Head: Normocephalic and atraumatic. Eyes:      Conjunctiva/sclera: Conjunctivae normal.   Cardiovascular:      Rate and Rhythm: Normal rate and regular rhythm. Heart sounds: Normal heart sounds. Pulmonary:      Effort: Pulmonary effort is normal.      Breath sounds: Normal breath sounds.    Chest:   Breasts:     Breasts are symmetrical.      Right: Normal. No inverted nipple, mass, nipple discharge, skin change or tenderness. Left: Normal. No inverted nipple, mass, nipple discharge, skin change or tenderness. Abdominal:      General: Abdomen is flat. There is no distension. Palpations: Abdomen is soft. There is no mass. Tenderness: There is no abdominal tenderness. There is no right CVA tenderness or left CVA tenderness. Genitourinary:     General: Normal vulva. Exam position: Lithotomy position. Pubic Area: No rash or pubic lice. Labia:         Right: No rash or tenderness. Left: No rash or tenderness. Urethra: No urethral pain. Vagina: Normal. No vaginal discharge. Cervix: Normal.      Uterus: Normal. No uterine prolapse. Adnexa: Right adnexa normal and left adnexa normal.        Right: No mass or tenderness. Left: No mass or tenderness. Musculoskeletal:         General: No tenderness. Normal range of motion. Cervical back: Normal range of motion. No tenderness. Right lower leg: No edema. Left lower leg: No edema. Lymphadenopathy:      Cervical: No cervical adenopathy. Upper Body:      Right upper body: No supraclavicular or axillary adenopathy. Left upper body: No supraclavicular or axillary adenopathy. Lower Body: No right inguinal adenopathy. No left inguinal adenopathy. Skin:     General: Skin is warm and dry. Neurological:      Mental Status: She is alert and oriented to person, place, and time. Motor: No weakness. Psychiatric:         Mood and Affect: Mood normal.         Behavior: Behavior normal.         Thought Content: Thought content normal.         Judgment: Judgment normal.         There are no Patient Instructions on file for this visit.

## 2023-08-07 ENCOUNTER — ANNUAL EXAM (OUTPATIENT)
Dept: OBGYN CLINIC | Facility: CLINIC | Age: 23
End: 2023-08-07
Payer: COMMERCIAL

## 2023-08-07 VITALS
WEIGHT: 279.4 LBS | HEIGHT: 70 IN | DIASTOLIC BLOOD PRESSURE: 82 MMHG | SYSTOLIC BLOOD PRESSURE: 128 MMHG | BODY MASS INDEX: 40 KG/M2

## 2023-08-07 DIAGNOSIS — Z30.09 ENCOUNTER FOR OTHER GENERAL COUNSELING OR ADVICE ON CONTRACEPTION: ICD-10-CM

## 2023-08-07 DIAGNOSIS — Z11.3 ROUTINE SCREENING FOR STI (SEXUALLY TRANSMITTED INFECTION): ICD-10-CM

## 2023-08-07 DIAGNOSIS — Z01.419 WELL WOMAN EXAM: Primary | ICD-10-CM

## 2023-08-07 PROCEDURE — G0145 SCR C/V CYTO,THINLAYER,RESCR: HCPCS

## 2023-08-07 PROCEDURE — S0612 ANNUAL GYNECOLOGICAL EXAMINA: HCPCS

## 2023-08-07 PROCEDURE — 87661 TRICHOMONAS VAGINALIS AMPLIF: CPT

## 2023-08-07 PROCEDURE — 87491 CHLMYD TRACH DNA AMP PROBE: CPT

## 2023-08-07 PROCEDURE — 87591 N.GONORRHOEAE DNA AMP PROB: CPT

## 2023-08-07 RX ORDER — NORETHINDRONE ACETATE AND ETHINYL ESTRADIOL 1.5-30(21)
1 KIT ORAL DAILY
Qty: 84 TABLET | Refills: 3 | Status: SHIPPED | OUTPATIENT
Start: 2023-08-07 | End: 2024-07-08

## 2023-08-07 RX ORDER — NORETHINDRONE ACETATE AND ETHINYL ESTRADIOL AND FERROUS FUMARATE 1.5-30(21)
1 KIT ORAL DAILY
Qty: 84 TABLET | Refills: 3 | OUTPATIENT
Start: 2023-08-07

## 2023-08-10 LAB
C TRACH DNA SPEC QL NAA+PROBE: NEGATIVE
N GONORRHOEA DNA SPEC QL NAA+PROBE: NEGATIVE
T VAGINALIS DNA SPEC QL NAA+PROBE: NEGATIVE

## 2023-08-13 LAB
LAB AP GYN PRIMARY INTERPRETATION: NORMAL
Lab: NORMAL
PATH INTERP SPEC-IMP: NORMAL

## 2023-08-14 DIAGNOSIS — B96.89 BV (BACTERIAL VAGINOSIS): Primary | ICD-10-CM

## 2023-08-14 DIAGNOSIS — N76.0 BV (BACTERIAL VAGINOSIS): Primary | ICD-10-CM

## 2023-08-14 RX ORDER — METRONIDAZOLE 500 MG/1
500 TABLET ORAL EVERY 12 HOURS SCHEDULED
Qty: 14 TABLET | Refills: 0 | Status: SHIPPED | OUTPATIENT
Start: 2023-08-14 | End: 2023-08-21

## 2023-10-30 ENCOUNTER — TELEPHONE (OUTPATIENT)
Dept: OBGYN CLINIC | Facility: CLINIC | Age: 23
End: 2023-10-30

## 2023-10-30 NOTE — TELEPHONE ENCOUNTER
Patient left message on machine - in the month of September did not get period. Took pregnancy test and was negative. Then got period 10/5 or 6th (?) and had for about 5 days and then got period again 10/22/23. When got it on 10/22 did forget to take birth control pill that Saturday before. Sometimes that does throw period off. But has never gone for 8 days straight of bleeding after already having period. Usually just some spotting if misses pill. Not sure if reason for concern or if should make an appointment. Works at an [employer], works 8am-5pm, if misses return call will try to call back before office closes.

## 2023-11-01 NOTE — TELEPHONE ENCOUNTER
Future Appointments   Date Time Provider Department Center   7/13/2021  3:40 PM Moriah Momin MD [id number]        Placed call to patient, she is concerned as she did not have a menses during the month of September. Patient did take a pregnancy test, negative in September. Patient then got her menses of October 5-6? Which lasted approximately 5 days. Patient then started bleeding again on 10/22. Patients last menses lasted 8 days. Patient is not taking her OCP correctly. Patient has skipped days or taking it at different times of the day. Reviewed the correct way to take birth control and to monitor s/s when taking the medication properly. Patient made aware to call the office if she develops any heavy bleeding/spotting, abdominal pain/cramping, nausea, vomiting, diarrhea, shortness of breath, chest pain, lightheadedness, dizziness, visual disturbances.

## 2023-11-13 ENCOUNTER — NURSE TRIAGE (OUTPATIENT)
Age: 23
End: 2023-11-13

## 2023-11-13 DIAGNOSIS — J30.1 SEASONAL ALLERGIC RHINITIS DUE TO POLLEN: ICD-10-CM

## 2023-11-13 RX ORDER — FLUTICASONE PROPIONATE 50 MCG
1 SPRAY, SUSPENSION (ML) NASAL 2 TIMES DAILY
Qty: 24 ML | Refills: 2 | Status: SHIPPED | OUTPATIENT
Start: 2023-11-13

## 2023-11-13 RX ORDER — MONTELUKAST SODIUM 10 MG/1
10 TABLET ORAL
Qty: 90 TABLET | Refills: 2 | Status: SHIPPED | OUTPATIENT
Start: 2023-11-13

## 2023-11-13 NOTE — TELEPHONE ENCOUNTER
Regarding: UTI SXS  ----- Message from [employer] sent at 11/13/2023  4:14 PM EST -----  Patient has sxs of a UTI,started on friday. She has on and off sxs of burning and urgency with urination. She wanted to know if she should take something OTC or be seen.

## 2024-01-14 ENCOUNTER — OFFICE VISIT (OUTPATIENT)
Dept: URGENT CARE | Facility: CLINIC | Age: 24
End: 2024-01-14
Payer: COMMERCIAL

## 2024-01-14 VITALS
BODY MASS INDEX: 40.67 KG/M2 | HEART RATE: 100 BPM | TEMPERATURE: 98.1 F | RESPIRATION RATE: 16 BRPM | OXYGEN SATURATION: 99 % | HEIGHT: 70 IN

## 2024-01-14 DIAGNOSIS — J02.9 SORE THROAT: ICD-10-CM

## 2024-01-14 DIAGNOSIS — J03.00 STREP TONSILLITIS: Primary | ICD-10-CM

## 2024-01-14 LAB — S PYO AG THROAT QL: POSITIVE

## 2024-01-14 PROCEDURE — 87880 STREP A ASSAY W/OPTIC: CPT | Performed by: NURSE PRACTITIONER

## 2024-01-14 PROCEDURE — 99213 OFFICE O/P EST LOW 20 MIN: CPT | Performed by: NURSE PRACTITIONER

## 2024-01-14 RX ORDER — AMOXICILLIN 875 MG/1
875 TABLET, COATED ORAL 2 TIMES DAILY
Qty: 20 TABLET | Refills: 0 | Status: SHIPPED | OUTPATIENT
Start: 2024-01-14 | End: 2024-01-24

## 2024-01-14 NOTE — LETTER
January 14, 2024     Patient: Diane Gomez   YOB: 2000   Date of Visit: 1/14/2024       To Whom it May Concern:    Diane Gomez was seen in my clinic on 1/14/2024. Please excuse from work 1/16/24 due to illness.      If you have any questions or concerns, please don't hesitate to call.         Sincerely,          ABENA Hurd        CC: No Recipients

## 2024-01-14 NOTE — PROGRESS NOTES
[facility]        NAME: Diane Gomez is a 23 y.o. female  : 2000    MRN: [id number]  DATE: 2024  TIME: 1:27 PM    Assessment and Plan   Strep tonsillitis [J03.00]  1. Strep tonsillitis  amoxicillin (AMOXIL) 875 mg tablet      2. Sore throat  POCT rapid ANTIGEN strepA            Patient Instructions     --Take antibiotic as prescribed completing full course  --Change toothbrush  --Frequent fluids  --Gargles, lozenges  --Motrin as needed  --Seek re-evaluation if no improvement/worsening over the next 48-72 hours.      Chief Complaint     Chief Complaint   Patient presents with    Cold Like Symptoms     Pt reports that she has pain in her ear and throat. She is not sure if the pain in her throat is because of her ears. She is also c/o mild body aches.          History of Present Illness       Here with complaints of sore throat, body aches, ear congestion x 2 days.    No cough, nasal congestion, fever.   No abdominal pain, N/V/D.  No OTC meds taken.    No known sick contacts .         Review of Systems   Review of Systems   Constitutional:  Negative for fever.   HENT:  Positive for sore throat. Negative for rhinorrhea.    Respiratory:  Negative for cough.    Gastrointestinal:  Negative for vomiting.   Musculoskeletal:  Positive for myalgias.   Neurological:  Negative for headaches.         Current Medications       Current Outpatient Medications:     amoxicillin (AMOXIL) 875 mg tablet, Take 1 tablet (875 mg total) by mouth 2 (two) times a day for 10 days, Disp: 20 tablet, Rfl: 0    montelukast (SINGULAIR) 10 mg tablet, Take 1 tablet (10 mg total) by mouth daily after breakfast, Disp: 90 tablet, Rfl: 2    norethindrone-ethinyl estradiol-iron (Loestrin Fe 1.5/30) 1.5-30 MG-MCG tablet, Take 1 tablet by mouth daily, Disp: 84 tablet, Rfl: 3    cetirizine (ZyrTEC) 5 MG tablet, TAKE 1 TABLET BY MOUTH AT BEDTIME, Disp: 90 tablet, Rfl: 1    fluticasone (FLONASE) 50 mcg/act nasal spray, 1  "spray into each nostril 2 (two) times a day, Disp: 24 mL, Rfl: 2    ibuprofen (MOTRIN) 600 mg tablet, Take 1 tablet (600 mg total) by mouth every 6 (six) hours as needed for mild pain, Disp: 30 tablet, Rfl: 0    sertraline (ZOLOFT) 25 mg tablet, TAKE 1 TABLET (25 MG TOTAL) BY MOUTH DAILY., Disp: 90 tablet, Rfl: 1    Current Allergies     Allergies as of 01/14/2024    (No Known Allergies)            The following portions of the patient's history were reviewed and updated as appropriate: allergies, current medications, past family history, past medical history, past social history, past surgical history and problem list.     Past Medical History:   Diagnosis Date    High cholesterol     Medical history reviewed with no changes        Past Surgical History:   Procedure Laterality Date    COLONOSCOPY W/ ENDOSCOPIC   2019    WISDOM TOOTH EXTRACTION  06/2021       Family History   Problem Relation Age of Onset    No Known Problems Mother     No Known Problems Father     Cancer Maternal Grandmother     Cancer Maternal Aunt         s/p partial hysterectomy but unsure type of cancer         Medications have been verified.        Objective   Pulse 100   Temp 98.1 °F (36.7 °C)   Resp 16   Ht 5' 9.5\" (1.765 m)   SpO2 99%   BMI 40.67 kg/m²   No LMP recorded. (Menstrual status: Birth Control).       Physical Exam     Physical Exam  Constitutional:       General: She is not in acute distress.     Appearance: Normal appearance. She is well-developed. She is not ill-appearing, toxic-appearing or diaphoretic.   HENT:      Head: Normocephalic.      Right Ear: Tympanic membrane, ear canal and external ear normal.      Left Ear: Tympanic membrane, ear canal and external ear normal.      Nose: No congestion or rhinorrhea.      Mouth/Throat:      Pharynx: Posterior oropharyngeal erythema present. No oropharyngeal exudate.      Comments: Tonsils 2+, mildly erythematous, without exudate.    Eyes:      General:         Right eye: No " discharge.         Left eye: No discharge.   Cardiovascular:      Rate and Rhythm: Normal rate and regular rhythm.      Heart sounds: Normal heart sounds. No murmur heard.  Pulmonary:      Effort: Pulmonary effort is normal. No respiratory distress.      Breath sounds: Normal breath sounds. No stridor. No wheezing, rhonchi or rales.   Chest:      Chest wall: No tenderness.   Abdominal:      Tenderness: There is no abdominal tenderness.   Musculoskeletal:      Cervical back: Neck supple.   Lymphadenopathy:      Cervical: No cervical adenopathy.   Skin:     General: Skin is warm and dry.   Neurological:      Mental Status: She is alert and oriented to person, place, and time.      Deep Tendon Reflexes: Reflexes are normal and symmetric.   Psychiatric:         Mood and Affect: Mood normal.

## 2024-01-14 NOTE — PATIENT INSTRUCTIONS
--Take antibiotic as prescribed completing full course  --Change toothbrush  --Frequent fluids  --Gargles, lozenges  --Motrin as needed  --Seek re-evaluation if no improvement/worsening over the next 48-72 hours.

## 2024-02-21 PROBLEM — Z01.419 ENCOUNTER FOR GYNECOLOGICAL EXAMINATION (GENERAL) (ROUTINE) WITHOUT ABNORMAL FINDINGS: Status: RESOLVED | Noted: 2021-07-21 | Resolved: 2024-02-21

## 2024-03-23 ENCOUNTER — OFFICE VISIT (OUTPATIENT)
Dept: URGENT CARE | Facility: CLINIC | Age: 24
End: 2024-03-23
Payer: COMMERCIAL

## 2024-03-23 VITALS
RESPIRATION RATE: 18 BRPM | HEART RATE: 90 BPM | OXYGEN SATURATION: 96 % | DIASTOLIC BLOOD PRESSURE: 72 MMHG | WEIGHT: 281.6 LBS | BODY MASS INDEX: 40.31 KG/M2 | HEIGHT: 70 IN | SYSTOLIC BLOOD PRESSURE: 135 MMHG | TEMPERATURE: 98.9 F

## 2024-03-23 DIAGNOSIS — J06.9 UPPER RESPIRATORY TRACT INFECTION, UNSPECIFIED TYPE: Primary | ICD-10-CM

## 2024-03-23 PROCEDURE — 99213 OFFICE O/P EST LOW 20 MIN: CPT

## 2024-03-23 RX ORDER — FLUTICASONE PROPIONATE 50 MCG
1 SPRAY, SUSPENSION (ML) NASAL DAILY
Qty: 11.1 ML | Refills: 0 | Status: SHIPPED | OUTPATIENT
Start: 2024-03-23

## 2024-03-23 RX ORDER — BROMPHENIRAMINE MALEATE, PSEUDOEPHEDRINE HYDROCHLORIDE, AND DEXTROMETHORPHAN HYDROBROMIDE 2; 30; 10 MG/5ML; MG/5ML; MG/5ML
5 SYRUP ORAL 4 TIMES DAILY PRN
Qty: 120 ML | Refills: 0 | Status: SHIPPED | OUTPATIENT
Start: 2024-03-23

## 2024-03-23 NOTE — PATIENT INSTRUCTIONS
Please begin Bromfed and Flonase as directed.   Stay well hydrated.   Follow up with PCP if no relief within one week.

## 2024-03-23 NOTE — PROGRESS NOTES
[facility]        NAME: Diane Gomez is a 23 y.o. female  : 2000    MRN: [id number]  DATE: 2024  TIME: 3:03 PM    Assessment and Plan   Upper respiratory tract infection, unspecified type [J06.9]  1. Upper respiratory tract infection, unspecified type  brompheniramine-pseudoephedrine-DM 30-2-10 MG/5ML syrup    fluticasone (FLONASE) 50 mcg/act nasal spray      Please begin Bromfed and Flonase as directed.   Stay well hydrated.   Follow up with PCP if no relief within one week.     Patient Instructions   Upper Respiratory Infection   WHAT YOU NEED TO KNOW:   An upper respiratory infection is also called a cold. It can affect your nose, throat, ears, and sinuses. Cold symptoms are usually worst for the first 3 to 5 days. Most people get better in 7 to 14 days. You may continue to cough for 2 to 3 weeks. Colds are caused by viruses and do not get better with antibiotics.  DISCHARGE INSTRUCTIONS:   Call your local emergency number (911 in the ) if:   You have chest pain or trouble breathing.        Return to the emergency department if:   You have a fever over 102ºF (39ºC).        Call your doctor if:   You have a low fever.     Your sore throat gets worse or you see white or yellow spots in your throat.     Your symptoms get worse after 3 to 5 days or are not better in 14 days.     You have a rash anywhere on your skin.     You have large, tender lumps in your neck.     You have thick, green, or yellow drainage from your nose.     You cough up thick yellow, green, or bloody mucus.     You have a bad earache.     You have questions or concerns about your condition or care.     Medicines:  You may need any of the following:  Decongestants  help reduce nasal congestion and help you breathe more easily. If you take decongestant pills, they may make you feel restless or cause problems with your sleep. Do not use decongestant sprays for more than a few days.     Cough suppressants  help  reduce coughing. Ask your healthcare provider which type of cough medicine is best for you.      NSAIDs , such as ibuprofen, help decrease swelling, pain, and fever. NSAIDs can cause stomach bleeding or kidney problems in certain people. If you take blood thinner medicine, always ask your healthcare provider if NSAIDs are safe for you. Always read the medicine label and follow directions.     Acetaminophen  decreases pain and fever. It is available without a doctor's order. Ask how much to take and how often to take it. Follow directions. Read the labels of all other medicines you are using to see if they also contain acetaminophen, or ask your doctor or pharmacist. Acetaminophen can cause liver damage if not taken correctly.     Take your medicine as directed.  Contact your healthcare provider if you think your medicine is not helping or if you have side effects. Tell your provider if you are allergic to any medicine. Keep a list of the medicines, vitamins, and herbs you take. Include the amounts, and when and why you take them. Bring the list or the pill bottles to follow-up visits. Carry your medicine list with you in case of an emergency.     Self-care:   Rest as much as possible.  Slowly start to do more each day.     Drink more liquids as directed.  Liquids will help thin and loosen mucus so you can cough it up. Liquids will also help prevent dehydration. Liquids that help prevent dehydration include water, fruit juice, and broth. Do not drink liquids that contain caffeine. Caffeine can increase your risk for dehydration. Ask your healthcare provider how much liquid to drink each day.     Soothe a sore throat.  Gargle with warm salt water. Make salt water by dissolving ¼ teaspoon salt in 1 cup warm water. You may also suck on hard candy or throat lozenges. You may use a sore throat spray.     Use a humidifier or vaporizer.  Use a cool mist humidifier or a vaporizer to increase air moisture in your home. This  may make it easier for you to breathe and help decrease your cough.     Use saline nasal drops as directed.  These help relieve congestion.     Apply petroleum-based jelly around the outside of your nostrils.  This can decrease irritation from blowing your nose.     Do not smoke.  Nicotine and other chemicals in cigarettes and cigars can make your symptoms worse. They can also cause infections such as bronchitis or pneumonia. Ask your healthcare provider for information if you currently smoke and need help to quit. E-cigarettes or smokeless tobacco still contain nicotine. Talk to your healthcare provider before you use these products.     Prevent a cold:   Wash your hands often.  Use soap and water every time you wash your hands. Rub your soapy hands together, lacing your fingers. Use the fingers of one hand to scrub under the nails of the other hand. Wash for at least 20 seconds. Rinse with warm, running water for several seconds. Then dry your hands. Use hand  gel if soap and water are not available. Do not touch your eyes or mouth without washing your hands first.          Cover a sneeze or cough.  Use a tissue that covers your mouth and nose. Put the used tissue in the trash right away. Use the bend of your arm if a tissue is not available. Wash your hands well with soap and water or use a hand . Do not stand close to anyone who is sneezing or coughing.     Try to stay away from others while you are sick.  This is especially important during the first 2 to 3 days when the virus is more easily spread. Wait until a fever, cough, or other symptoms are gone before you return to work or other regular activities.     Do not share items while you are sick.  This includes food, drinks, eating utensils, and dishes.     Follow up with your doctor as directed:  Write down your questions so you remember to ask them during your visits.  © Copyright [employer]ative 2023 Information is for End User's use only and  may not be sold, redistributed or otherwise used for commercial purposes.  The above information is an  only. It is not intended as medical advice for individual conditions or treatments. Talk to your doctor, nurse or pharmacist before following any medical regimen to see if it is safe and effective for you.       Follow up with PCP in 3-5 days.  Proceed to  ER if symptoms worsen.    Chief Complaint     Chief Complaint   Patient presents with    URI     States she starting having burning in back of throat on Wednesday. By last night started to become chest burning. Has had thick green mucus since Wednesday. OTC- not taking -         History of Present Illness       URI   This is a new problem. The current episode started in the past 7 days (x 3 days). The problem has been gradually worsening. There has been no fever. Associated symptoms include congestion and coughing. Pertinent negatives include no abdominal pain, chest pain, diarrhea, dysuria, ear pain, headaches, joint pain, joint swelling, nausea, neck pain, plugged ear sensation, rash, rhinorrhea, sinus pain, sneezing, sore throat, swollen glands, vomiting or wheezing. She has tried nothing for the symptoms. The treatment provided no relief.       Review of Systems   Review of Systems   Constitutional:  Negative for fatigue and fever.   HENT:  Positive for congestion. Negative for ear discharge, ear pain, postnasal drip, rhinorrhea, sinus pressure, sinus pain, sneezing and sore throat.    Eyes: Negative.  Negative for pain, discharge, redness and itching.   Respiratory:  Positive for cough. Negative for apnea, choking, chest tightness, shortness of breath, wheezing and stridor.    Cardiovascular: Negative.  Negative for chest pain and palpitations.   Gastrointestinal: Negative.  Negative for abdominal pain, diarrhea, nausea and vomiting.   Endocrine: Negative.  Negative for polydipsia, polyphagia and polyuria.   Genitourinary: Negative.  Negative  for decreased urine volume, dysuria and flank pain.   Musculoskeletal: Negative.  Negative for arthralgias, back pain, joint pain, myalgias, neck pain and neck stiffness.   Skin: Negative.  Negative for color change and rash.   Allergic/Immunologic: Negative.  Negative for environmental allergies.   Neurological: Negative.  Negative for dizziness, facial asymmetry, light-headedness, numbness and headaches.   Hematological: Negative.  Negative for adenopathy.   Psychiatric/Behavioral: Negative.           Current Medications       Current Outpatient Medications:     brompheniramine-pseudoephedrine-DM 30-2-10 MG/5ML syrup, Take 5 mL by mouth 4 (four) times a day as needed for allergies, Disp: 120 mL, Rfl: 0    fluticasone (FLONASE) 50 mcg/act nasal spray, 1 spray into each nostril daily, Disp: 11.1 mL, Rfl: 0    montelukast (SINGULAIR) 10 mg tablet, Take 1 tablet (10 mg total) by mouth daily after breakfast, Disp: 90 tablet, Rfl: 2    norethindrone-ethinyl estradiol-iron (Loestrin Fe 1.5/30) 1.5-30 MG-MCG tablet, Take 1 tablet by mouth daily, Disp: 84 tablet, Rfl: 3    cetirizine (ZyrTEC) 5 MG tablet, TAKE 1 TABLET BY MOUTH AT BEDTIME (Patient not taking: Reported on 3/23/2024), Disp: 90 tablet, Rfl: 1    ibuprofen (MOTRIN) 600 mg tablet, Take 1 tablet (600 mg total) by mouth every 6 (six) hours as needed for mild pain (Patient not taking: Reported on 3/23/2024), Disp: 30 tablet, Rfl: 0    sertraline (ZOLOFT) 25 mg tablet, TAKE 1 TABLET (25 MG TOTAL) BY MOUTH DAILY., Disp: 90 tablet, Rfl: 1    Current Allergies     Allergies as of 03/23/2024    (No Known Allergies)            The following portions of the patient's history were reviewed and updated as appropriate: allergies, current medications, past family history, past medical history, past social history, past surgical history and problem list.     Past Medical History:   Diagnosis Date    High cholesterol     Medical history reviewed with no changes        Past  "Surgical History:   Procedure Laterality Date    COLONOSCOPY W/ ENDOSCOPIC US  2019    WISDOM TOOTH EXTRACTION  06/2021       Family History   Problem Relation Age of Onset    No Known Problems Mother     No Known Problems Father     Cancer Maternal Grandmother     Cancer Maternal Aunt         s/p partial hysterectomy but unsure type of cancer         Medications have been verified.        Objective   /72   Pulse 90   Temp 98.9 °F (37.2 °C)   Resp 18   Ht 5' 9.5\" (1.765 m)   Wt 128 kg (281 lb 9.6 oz)   SpO2 96%   BMI 40.99 kg/m²        Physical Exam     Physical Exam  Vitals and nursing note reviewed.   Constitutional:       General: She is not in acute distress.     Appearance: Normal appearance. She is not ill-appearing, toxic-appearing or diaphoretic.      Interventions: She is not intubated.  HENT:      Head: Normocephalic and atraumatic.      Right Ear: Tympanic membrane, ear canal and external ear normal. There is no impacted cerumen.      Left Ear: Tympanic membrane, ear canal and external ear normal. There is no impacted cerumen.      Nose: Nose normal. No congestion or rhinorrhea.      Mouth/Throat:      Mouth: Mucous membranes are moist.      Pharynx: Oropharynx is clear. Uvula midline. No pharyngeal swelling, oropharyngeal exudate, posterior oropharyngeal erythema or uvula swelling.      Tonsils: No tonsillar exudate or tonsillar abscesses. 1+ on the right. 1+ on the left.   Eyes:      Extraocular Movements: Extraocular movements intact.      Conjunctiva/sclera: Conjunctivae normal.      Pupils: Pupils are equal, round, and reactive to light.   Neck:      Thyroid: No thyroid mass, thyromegaly or thyroid tenderness.   Cardiovascular:      Rate and Rhythm: Normal rate and regular rhythm.      Pulses: Normal pulses.      Heart sounds: Normal heart sounds, S1 normal and S2 normal. Heart sounds not distant. No murmur heard.     No friction rub. No gallop.   Pulmonary:      Effort: Pulmonary effort " is normal. No tachypnea, bradypnea, accessory muscle usage, prolonged expiration, respiratory distress or retractions. She is not intubated.      Breath sounds: Normal breath sounds. No stridor, decreased air movement or transmitted upper airway sounds. No decreased breath sounds, wheezing, rhonchi or rales.   Abdominal:      General: Bowel sounds are normal.      Palpations: Abdomen is soft.      Tenderness: There is no abdominal tenderness. There is no guarding or rebound.   Musculoskeletal:         General: Normal range of motion.      Cervical back: Full passive range of motion without pain, normal range of motion and neck supple. No tenderness. No spinous process tenderness or muscular tenderness. Normal range of motion.   Skin:     General: Skin is warm and dry.      Capillary Refill: Capillary refill takes less than 2 seconds.   Neurological:      General: No focal deficit present.      Mental Status: She is alert and oriented to person, place, and time.      Cranial Nerves: No cranial nerve deficit.   Psychiatric:         Mood and Affect: Mood normal.         Behavior: Behavior normal.

## 2024-04-08 ENCOUNTER — NURSE TRIAGE (OUTPATIENT)
Age: 24
End: 2024-04-08

## 2024-04-08 NOTE — TELEPHONE ENCOUNTER
"Pt called in with reports of \"period flu\" in November, March and April. Believes that she gets it other months as well but not sure. States that 2-3 days before her period comes she gets body aches, chills and nausea. Symptoms persist for slightly over 24 hours. Pt states periods are regular. Takes OCP and sometimes forgets to take pills. States she will forget about 7 pills within a month time frame. Not interested in other birth control methods at this time. Recommended finding a time that works best for her and setting alarms on her phone to remember to take it consistently. Pt verbalized understanding.   Reason for Disposition   Patient's symptoms are safe to treat at home per nursing judgment    Answer Assessment - Initial Assessment Questions  1. REASON FOR CALL: \"What is your main concern right now?\"      Period flu  2. ONSET: \"When did the it start?\"      States it has been going on for awhile but remembers getting it in November, march and april  3. SEVERITY: \"How bad is the symptoms?\"      States she cannot function when she has them  4. FEVER: \"Do you have a fever?\"      denies  5. OTHER SYMPTOMS: \"Do you have any other new symptoms?\"      denies    Protocols used: No Protocol Available-ADULT-[place]    "

## 2024-04-10 NOTE — TELEPHONE ENCOUNTER
"Please review with the patient that missed pills can result in odd symptoms such as a \"period flu\". She needs to consistency take the pill to prevent side effects and symptoms. Possibly setting a phone alarm would help her with remembering to take it. "

## 2024-04-12 NOTE — TELEPHONE ENCOUNTER
Placed call to patient, left a non detailed message to return our call. Third attempt at contacting patient. [employer] message sent to patient. Sent to office clinical staff and/or provider to make aware.

## 2024-04-25 ENCOUNTER — RA CDI HCC (OUTPATIENT)
Dept: OTHER | Facility: HOSPITAL | Age: 24
End: 2024-04-25

## 2024-04-25 PROBLEM — E66.01 SEVERE OBESITY (BMI >= 40) (HCC): Status: ACTIVE | Noted: 2022-10-19

## 2024-05-01 ENCOUNTER — OFFICE VISIT (OUTPATIENT)
Dept: INTERNAL MEDICINE CLINIC | Facility: CLINIC | Age: 24
End: 2024-05-01
Payer: COMMERCIAL

## 2024-05-01 VITALS
WEIGHT: 277 LBS | SYSTOLIC BLOOD PRESSURE: 118 MMHG | BODY MASS INDEX: 39.65 KG/M2 | OXYGEN SATURATION: 99 % | HEIGHT: 70 IN | DIASTOLIC BLOOD PRESSURE: 80 MMHG | HEART RATE: 87 BPM

## 2024-05-01 DIAGNOSIS — R73.9 HYPERGLYCEMIA: ICD-10-CM

## 2024-05-01 DIAGNOSIS — J30.1 SEASONAL ALLERGIC RHINITIS DUE TO POLLEN: ICD-10-CM

## 2024-05-01 DIAGNOSIS — E55.9 VITAMIN D DEFICIENCY: ICD-10-CM

## 2024-05-01 DIAGNOSIS — Z13.0 SCREENING FOR DEFICIENCY ANEMIA: ICD-10-CM

## 2024-05-01 DIAGNOSIS — Z00.00 ANNUAL PHYSICAL EXAM: Primary | ICD-10-CM

## 2024-05-01 DIAGNOSIS — Z13.6 SCREENING FOR CARDIOVASCULAR CONDITION: ICD-10-CM

## 2024-05-01 DIAGNOSIS — E78.2 MIXED HYPERLIPIDEMIA: ICD-10-CM

## 2024-05-01 PROCEDURE — 99395 PREV VISIT EST AGE 18-39: CPT | Performed by: INTERNAL MEDICINE

## 2024-05-01 PROCEDURE — 3725F SCREEN DEPRESSION PERFORMED: CPT | Performed by: INTERNAL MEDICINE

## 2024-05-01 RX ORDER — MONTELUKAST SODIUM 10 MG/1
10 TABLET ORAL
Qty: 90 TABLET | Refills: 2 | Status: SHIPPED | OUTPATIENT
Start: 2024-05-01 | End: 2024-05-01

## 2024-05-01 RX ORDER — MONTELUKAST SODIUM 10 MG/1
10 TABLET ORAL
Qty: 90 TABLET | Refills: 2 | Status: SHIPPED | OUTPATIENT
Start: 2024-05-01 | End: 2024-05-06 | Stop reason: SDUPTHER

## 2024-05-01 NOTE — PROGRESS NOTES
ADULT ANNUAL PHYSICAL  [facility] INTERNAL MEDICINE    NAME: Diane Gomez  AGE: 23 y.o. SEX: female  : 2000     DATE: 2024     Assessment and Plan:     Problem List Items Addressed This Visit        Respiratory    Seasonal allergic rhinitis due to pollen    Relevant Medications    montelukast (SINGULAIR) 10 mg tablet       Other    Mixed hyperlipidemia    Relevant Orders    Lipid Panel with Direct LDL reflex    Albumin / creatinine urine ratio    Urinalysis with microscopic   Other Visit Diagnoses     Annual physical exam    -  Primary    Relevant Orders    Comprehensive metabolic panel    Hemoglobin A1C    Lipid Panel with Direct LDL reflex    Albumin / creatinine urine ratio    Urinalysis with microscopic    Vitamin D 25 hydroxy    Screening for deficiency anemia        Relevant Orders    CBC and differential    Screening for cardiovascular condition        Relevant Orders    Comprehensive metabolic panel    Hyperglycemia        Relevant Orders    Hemoglobin A1C    Vitamin D deficiency        Relevant Orders    Vitamin D 25 hydroxy          Immunizations and preventive care screenings were discussed with patient today. Appropriate education was printed on patient's after visit summary.    Counseling:  Alcohol/drug use: discussed moderation in alcohol intake, the recommendations for healthy alcohol use, and avoidance of illicit drug use.  Dental Health: discussed importance of regular tooth brushing, flossing, and dental visits.  Injury prevention: discussed safety/seat belts, safety helmets, smoke detectors, carbon dioxide detectors, and smoking near bedding or upholstery.  Sexual health: discussed sexually transmitted diseases, partner selection, use of condoms, avoidance of unintended pregnancy, and contraceptive alternatives.  Exercise: the importance of regular exercise/physical activity was discussed. Recommend exercise 3-5 times per week for  at least 30 minutes.       Depression Screening and Follow-up Plan: Patient was screened for depression during today's encounter. They screened negative with a PHQ-2 score of 0.        Return in about 6 months (around 11/1/2024).     Chief Complaint:     Chief Complaint   Patient presents with   • Physical Exam      History of Present Illness:     Adult Annual Physical   Patient here for a comprehensive physical exam. The patient reports no problems.    Diet and Physical Activity  Diet/Nutrition: poor diet.   Exercise: walking.      Depression Screening  PHQ-2/9 Depression Screening    Little interest or pleasure in doing things: 0 - not at all  Feeling down, depressed, or hopeless: 0 - not at all  PHQ-2 Score: 0  PHQ-2 Interpretation: Negative depression screen       General Health  Sleep: sleeps well.   Hearing: normal - bilateral.  Vision: no vision problems.   Dental: regular dental visits.       /GYN Health  Follows with gynecology? yes   Last menstrual period: irregular  Contraceptive method: oral contraceptives.  History of STDs?: no.     Advanced Care Planning  Do you have an advanced directive? no  Do you have a durable medical power of ? no  ACP document given to the patient? no      Review of Systems:     Review of Systems   Constitutional:  Negative for activity change, appetite change, chills, diaphoresis, fatigue, fever and unexpected weight change.   HENT:  Negative for congestion, dental problem, drooling, ear discharge, ear pain, facial swelling, hearing loss, mouth sores, nosebleeds, postnasal drip, rhinorrhea, sinus pressure, sneezing, sore throat, tinnitus, trouble swallowing and voice change.    Eyes:  Negative for photophobia, pain, discharge, redness, itching and visual disturbance.   Respiratory:  Negative for apnea, cough, choking, chest tightness, shortness of breath, wheezing and stridor.    Cardiovascular:  Negative for chest pain, palpitations and leg swelling.    Gastrointestinal:  Negative for abdominal distention, abdominal pain, anal bleeding, blood in stool, constipation, diarrhea, nausea, rectal pain and vomiting.   Endocrine: Negative for cold intolerance, heat intolerance, polydipsia, polyphagia and polyuria.   Genitourinary:  Negative for decreased urine volume, difficulty urinating, dysuria, enuresis, flank pain, frequency, genital sores, hematuria and urgency.   Musculoskeletal:  Negative for arthralgias, back pain, gait problem, joint swelling, myalgias, neck pain and neck stiffness.   Skin:  Negative for color change, pallor, rash and wound.   Allergic/Immunologic: Negative.  Negative for environmental allergies, food allergies and immunocompromised state.   Neurological:  Negative for dizziness, tremors, seizures, syncope, facial asymmetry, speech difficulty, weakness, light-headedness, numbness and headaches.   Psychiatric/Behavioral:  Negative for agitation, behavioral problems, confusion, decreased concentration, dysphoric mood, hallucinations, self-injury, sleep disturbance and suicidal ideas. The patient is not nervous/anxious and is not hyperactive.       Past Medical History:     Past Medical History:   Diagnosis Date   • High cholesterol    • Medical history reviewed with no changes       Past Surgical History:     Past Surgical History:   Procedure Laterality Date   • COLONOSCOPY W/ ENDOSCOPIC   2019   • WISDOM TOOTH EXTRACTION  06/2021      Social History:     Social History     Socioeconomic History   • Marital status: Single     Spouse name: None   • Number of children: None   • Years of education: None   • Highest education level: None   Occupational History   • None   Tobacco Use   • Smoking status: Never   • Smokeless tobacco: Never   Vaping Use   • Vaping status: Never Used   Substance and Sexual Activity   • Alcohol use: Yes     Alcohol/week: 1.0 standard drink of alcohol     Types: 1 Glasses of wine per week     Comment: Casual alcohol  "assumption   • Drug use: Never   • Sexual activity: Yes     Partners: Male     Birth control/protection: OCP   Other Topics Concern   • None   Social History Narrative   • None     Social Determinants of Health     Financial Resource Strain: Not on file   Food Insecurity: Not on file   Transportation Needs: Not on file   Physical Activity: Not on file   Stress: Not on file   Social Connections: Not on file   Intimate Partner Violence: Not on file   Housing Stability: Not on file      Family History:     Family History   Problem Relation Age of Onset   • No Known Problems Mother    • No Known Problems Father    • Cancer Maternal Grandmother    • Cancer Maternal Aunt         s/p partial hysterectomy but unsure type of cancer      Current Medications:     Current Outpatient Medications   Medication Sig Dispense Refill   • montelukast (SINGULAIR) 10 mg tablet Take 1 tablet (10 mg total) by mouth daily after breakfast 90 tablet 2   • norethindrone-ethinyl estradiol-iron (Loestrin Fe 1.5/30) 1.5-30 MG-MCG tablet Take 1 tablet by mouth daily 84 tablet 3     No current facility-administered medications for this visit.      Allergies:     No Known Allergies   Physical Exam:     /80   Pulse 87   Ht 5' 9.5\" (1.765 m)   Wt 126 kg (277 lb)   SpO2 99%   BMI 40.32 kg/m²     Physical Exam  Vitals and nursing note reviewed.   Constitutional:       General: She is not in acute distress.     Appearance: She is well-developed. She is obese.   HENT:      Head: Normocephalic and atraumatic.   Eyes:      Conjunctiva/sclera: Conjunctivae normal.   Cardiovascular:      Rate and Rhythm: Normal rate and regular rhythm.      Heart sounds: No murmur heard.  Pulmonary:      Effort: Pulmonary effort is normal. No respiratory distress.      Breath sounds: Normal breath sounds.   Abdominal:      Palpations: Abdomen is soft.      Tenderness: There is no abdominal tenderness.   Musculoskeletal:         General: No swelling.      Cervical " back: Neck supple.   Skin:     General: Skin is warm and dry.      Capillary Refill: Capillary refill takes less than 2 seconds.   Neurological:      General: No focal deficit present.      Mental Status: She is alert and oriented to person, place, and time.   Psychiatric:         Mood and Affect: Mood normal.          Judah Henao MD   [facility] INTERNAL MEDICINE

## 2024-05-03 ENCOUNTER — APPOINTMENT (OUTPATIENT)
Dept: LAB | Facility: HOSPITAL | Age: 24
End: 2024-05-03
Payer: COMMERCIAL

## 2024-05-03 ENCOUNTER — TELEPHONE (OUTPATIENT)
Age: 24
End: 2024-05-03

## 2024-05-03 DIAGNOSIS — Z00.00 ANNUAL PHYSICAL EXAM: ICD-10-CM

## 2024-05-03 DIAGNOSIS — D75.839 THROMBOCYTOSIS: Primary | ICD-10-CM

## 2024-05-03 DIAGNOSIS — E55.9 VITAMIN D DEFICIENCY: ICD-10-CM

## 2024-05-03 DIAGNOSIS — E78.2 MIXED HYPERLIPIDEMIA: ICD-10-CM

## 2024-05-03 DIAGNOSIS — R73.9 HYPERGLYCEMIA: ICD-10-CM

## 2024-05-03 DIAGNOSIS — Z13.0 SCREENING FOR DEFICIENCY ANEMIA: ICD-10-CM

## 2024-05-03 DIAGNOSIS — Z13.6 SCREENING FOR CARDIOVASCULAR CONDITION: ICD-10-CM

## 2024-05-03 LAB
25(OH)D3 SERPL-MCNC: 23.8 NG/ML (ref 30–100)
ALBUMIN SERPL BCP-MCNC: 4.5 G/DL (ref 3.5–5)
ALP SERPL-CCNC: 59 U/L (ref 34–104)
ALT SERPL W P-5'-P-CCNC: 14 U/L (ref 7–52)
ANION GAP SERPL CALCULATED.3IONS-SCNC: 9 MMOL/L (ref 4–13)
AST SERPL W P-5'-P-CCNC: 14 U/L (ref 13–39)
BACTERIA UR QL AUTO: ABNORMAL /HPF
BASOPHILS # BLD AUTO: 0.04 THOUSANDS/ÂΜL (ref 0–0.1)
BASOPHILS NFR BLD AUTO: 0 % (ref 0–1)
BILIRUB SERPL-MCNC: 0.55 MG/DL (ref 0.2–1)
BILIRUB UR QL STRIP: NEGATIVE
BUN SERPL-MCNC: 14 MG/DL (ref 5–25)
CALCIUM SERPL-MCNC: 9.8 MG/DL (ref 8.4–10.2)
CHLORIDE SERPL-SCNC: 101 MMOL/L (ref 96–108)
CHOLEST SERPL-MCNC: 185 MG/DL
CLARITY UR: CLEAR
CO2 SERPL-SCNC: 27 MMOL/L (ref 21–32)
COLOR UR: YELLOW
CREAT SERPL-MCNC: 0.68 MG/DL (ref 0.6–1.3)
CREAT UR-MCNC: 190.8 MG/DL
EOSINOPHIL # BLD AUTO: 0.08 THOUSAND/ÂΜL (ref 0–0.61)
EOSINOPHIL NFR BLD AUTO: 1 % (ref 0–6)
ERYTHROCYTE [DISTWIDTH] IN BLOOD BY AUTOMATED COUNT: 14.4 % (ref 11.6–15.1)
GFR SERPL CREATININE-BSD FRML MDRD: 121 ML/MIN/1.73SQ M
GLUCOSE P FAST SERPL-MCNC: 77 MG/DL (ref 65–99)
GLUCOSE UR STRIP-MCNC: NEGATIVE MG/DL
HCT VFR BLD AUTO: 39.6 % (ref 34.8–46.1)
HDLC SERPL-MCNC: 48 MG/DL
HGB BLD-MCNC: 12.8 G/DL (ref 11.5–15.4)
HGB UR QL STRIP.AUTO: NEGATIVE
IMM GRANULOCYTES # BLD AUTO: 0.05 THOUSAND/UL (ref 0–0.2)
IMM GRANULOCYTES NFR BLD AUTO: 0 % (ref 0–2)
KETONES UR STRIP-MCNC: NEGATIVE MG/DL
LDLC SERPL CALC-MCNC: 118 MG/DL (ref 0–100)
LEUKOCYTE ESTERASE UR QL STRIP: NEGATIVE
LYMPHOCYTES # BLD AUTO: 3.94 THOUSANDS/ÂΜL (ref 0.6–4.47)
LYMPHOCYTES NFR BLD AUTO: 28 % (ref 14–44)
MCH RBC QN AUTO: 27.1 PG (ref 26.8–34.3)
MCHC RBC AUTO-ENTMCNC: 32.3 G/DL (ref 31.4–37.4)
MCV RBC AUTO: 84 FL (ref 82–98)
MICROALBUMIN UR-MCNC: 10.2 MG/L
MICROALBUMIN/CREAT 24H UR: 5 MG/G CREATININE (ref 0–30)
MONOCYTES # BLD AUTO: 0.89 THOUSAND/ÂΜL (ref 0.17–1.22)
MONOCYTES NFR BLD AUTO: 6 % (ref 4–12)
MUCOUS THREADS UR QL AUTO: ABNORMAL
NEUTROPHILS # BLD AUTO: 9.18 THOUSANDS/ÂΜL (ref 1.85–7.62)
NEUTS SEG NFR BLD AUTO: 65 % (ref 43–75)
NITRITE UR QL STRIP: NEGATIVE
NON-SQ EPI CELLS URNS QL MICRO: ABNORMAL /HPF
NRBC BLD AUTO-RTO: 0 /100 WBCS
PH UR STRIP.AUTO: 6 [PH]
PLATELET # BLD AUTO: 451 THOUSANDS/UL (ref 149–390)
PMV BLD AUTO: 9.6 FL (ref 8.9–12.7)
POTASSIUM SERPL-SCNC: 3.8 MMOL/L (ref 3.5–5.3)
PROT SERPL-MCNC: 8.2 G/DL (ref 6.4–8.4)
PROT UR STRIP-MCNC: NEGATIVE MG/DL
RBC # BLD AUTO: 4.72 MILLION/UL (ref 3.81–5.12)
RBC #/AREA URNS AUTO: ABNORMAL /HPF
SODIUM SERPL-SCNC: 137 MMOL/L (ref 135–147)
SP GR UR STRIP.AUTO: 1.02 (ref 1–1.03)
TRIGL SERPL-MCNC: 95 MG/DL
UROBILINOGEN UR QL STRIP.AUTO: 0.2 E.U./DL
WBC # BLD AUTO: 14.18 THOUSAND/UL (ref 4.31–10.16)
WBC #/AREA URNS AUTO: ABNORMAL /HPF

## 2024-05-03 PROCEDURE — 85025 COMPLETE CBC W/AUTO DIFF WBC: CPT

## 2024-05-03 PROCEDURE — 82043 UR ALBUMIN QUANTITATIVE: CPT

## 2024-05-03 PROCEDURE — 80061 LIPID PANEL: CPT

## 2024-05-03 PROCEDURE — 80053 COMPREHEN METABOLIC PANEL: CPT

## 2024-05-03 PROCEDURE — 82570 ASSAY OF URINE CREATININE: CPT

## 2024-05-03 PROCEDURE — 83036 HEMOGLOBIN GLYCOSYLATED A1C: CPT

## 2024-05-03 PROCEDURE — 36415 COLL VENOUS BLD VENIPUNCTURE: CPT

## 2024-05-03 PROCEDURE — 81001 URINALYSIS AUTO W/SCOPE: CPT

## 2024-05-03 PROCEDURE — 82306 VITAMIN D 25 HYDROXY: CPT

## 2024-05-03 NOTE — TELEPHONE ENCOUNTER
Patient called and stated she had labs drawn today and saw 3 of the results in MyChart.  She is concerned as some of the results seem high.  She is requesting a call back to review the results and find out what is recommended.  Thank you!

## 2024-05-03 NOTE — TELEPHONE ENCOUNTER
Left message for patient. Not all test are completed. Dr Henao is back in the office on Monday. Will review with him and call patient back.

## 2024-05-04 LAB
EST. AVERAGE GLUCOSE BLD GHB EST-MCNC: 117 MG/DL
HBA1C MFR BLD: 5.7 %

## 2024-05-06 ENCOUNTER — TELEPHONE (OUTPATIENT)
Dept: INTERNAL MEDICINE CLINIC | Facility: CLINIC | Age: 24
End: 2024-05-06

## 2024-05-06 ENCOUNTER — NURSE TRIAGE (OUTPATIENT)
Age: 24
End: 2024-05-06

## 2024-05-06 DIAGNOSIS — D75.839 THROMBOCYTOSIS: Primary | ICD-10-CM

## 2024-05-06 DIAGNOSIS — J30.1 SEASONAL ALLERGIC RHINITIS DUE TO POLLEN: ICD-10-CM

## 2024-05-06 RX ORDER — MONTELUKAST SODIUM 10 MG/1
10 TABLET ORAL
Qty: 90 TABLET | Refills: 2 | Status: SHIPPED | OUTPATIENT
Start: 2024-05-06

## 2024-05-06 NOTE — PROGRESS NOTES
Discussed all the labs low vitamin D advised to take vitamin D 1000 units daily for hyperlipidemia prediabetes advised to lose weight    Leukocytosis thrombocytosis advised to be seen by hematologist patient asymptomatic

## 2024-05-06 NOTE — TELEPHONE ENCOUNTER
Advised to lose weight for high LDL prediabetes to be seen by hematologist for leukocytosis and thrombocytosis discussed with the patient at length

## 2024-05-06 NOTE — TELEPHONE ENCOUNTER
Transmission to pharmacy error occurred for Singulair. Medication resent to pharmacy. Receipt confirmed by pharmacy.

## 2024-05-06 NOTE — TELEPHONE ENCOUNTER
"Patient calling with concerns she is having an onset of BV. Pt prone to BV in general. Currently does not have symptoms, however, had a UA through provider as well as lab work - noted high WBC and bacteria in urine. Pt does not have s/s of UTI either. RN advised can see if provider ok to send a prescription in due to pt history, but unsure due to no symptoms. Pt verbalized an understanding. States staff can call (OK to leave voicemail if does not answer) or send a [facility] [employer] message with follow up.     Answer Assessment - Initial Assessment Questions  1. SYMPTOM: \"What's the main symptom you're concerned about?\" (e.g., pain, itching, dryness)      Denies  2. LOCATION: \"Where is the  s/s located?\" (e.g., inside/outside, left/right)      NA  3. ONSET: \"When did the  s/s  start?\"      NA  4. PAIN: \"Is there any pain?\" If Yes, ask: \"How bad is it?\" (Scale: 1-10; mild, moderate, severe)      Denies  5. ITCHING: \"Is there any itching?\" If Yes, ask: \"How bad is it?\" (Scale: 1-10; mild, moderate, severe)      Denies  6. CAUSE: \"What do you think is causing the discharge?\" \"Have you had the same problem before? What happened then?\"      NA  7. OTHER SYMPTOMS: \"Do you have any other symptoms?\" (e.g., fever, itching, vaginal bleeding, pain with urination, injury to genital area, vaginal foreign body)      Denies  8. PREGNANCY: \"Is there any chance you are pregnant?\" \"When was your last menstrual period?\"      Denies    Protocols used: Vaginal Symptoms-ADULT-[place]    "

## 2024-05-07 ENCOUNTER — TELEPHONE (OUTPATIENT)
Dept: HEMATOLOGY ONCOLOGY | Facility: CLINIC | Age: 24
End: 2024-05-07

## 2024-05-07 ENCOUNTER — TELEPHONE (OUTPATIENT)
Age: 24
End: 2024-05-07

## 2024-05-07 NOTE — TELEPHONE ENCOUNTER
Lmom to let pt know provider did not send treatment in since she is not having symptoms. Informed of providers note regarding uranalysis results.

## 2024-05-07 NOTE — TELEPHONE ENCOUNTER
Patient scheduled an appointment with Hematology as instructed, soonest they have is June 10th. States she is anxious with not knowing what is wrong with her. Would like more information regarding the reason for the referral ( reassurance ) or a sooner appointment with Hematology. Asked for detailed message to be left since she will not be able to answer her phone .    What Type Of Note Output Would You Prefer (Optional)?: Standard Output How Severe Is Your Skin Lesion?: moderate Has Your Skin Lesion Been Treated?: not been treated Is This A New Presentation, Or A Follow-Up?: Skin Lesions

## 2024-05-07 NOTE — TELEPHONE ENCOUNTER
Appointment Change  Cancel, Reschedule, Change to Virtual      Who are you speaking with? Patient   If it is not the patient, is the caller listed on the communication consent form? N/A   Which provider is the appointment scheduled with? Yris Mueller PA-C   When was the original appointment scheduled?    Please list date and time 6/10/24 11am   At which location is the appointment scheduled to take place? [place]   Was the appointment rescheduled?     Was the appointment changed from an in person visit to a virtual visit?    If so, please list the details of the change. N/a   What is the reason for the appointment change? Seeing another provider       Was STAR transport scheduled? N/A   Does STAR transport need to be scheduled for the new visit (if applicable) N/A   Does the patient need an infusion appointment rescheduled? N/A   Does the patient have an upcoming infusion appointment scheduled? If so, when? No   Is the patient undergoing chemotherapy? N/A   For appointments cancelled with less than 24 hours:  Was the no-show policy reviewed? N/A

## 2024-05-07 NOTE — TELEPHONE ENCOUNTER
If symptoms start to present with abnormal discharge, vaginal odor, or irritation, I can send treatment. However, without symptoms, I do not recommend treatment. The urinalysis shows epithelial cells/some bacteria, which could have just been skin contaminant.

## 2024-08-28 ENCOUNTER — TELEPHONE (OUTPATIENT)
Age: 24
End: 2024-08-28

## 2024-08-28 ENCOUNTER — ANNUAL EXAM (OUTPATIENT)
Dept: OBGYN CLINIC | Facility: CLINIC | Age: 24
End: 2024-08-28
Payer: COMMERCIAL

## 2024-08-28 VITALS
WEIGHT: 252 LBS | DIASTOLIC BLOOD PRESSURE: 84 MMHG | BODY MASS INDEX: 36.08 KG/M2 | SYSTOLIC BLOOD PRESSURE: 120 MMHG | HEIGHT: 70 IN

## 2024-08-28 DIAGNOSIS — Z01.419 WOMEN'S ANNUAL ROUTINE GYNECOLOGICAL EXAMINATION: Primary | ICD-10-CM

## 2024-08-28 DIAGNOSIS — Z12.4 SCREENING FOR CERVICAL CANCER: ICD-10-CM

## 2024-08-28 DIAGNOSIS — L73.2 HIDRADENITIS SUPPURATIVA: ICD-10-CM

## 2024-08-28 DIAGNOSIS — Z11.3 ROUTINE SCREENING FOR STI (SEXUALLY TRANSMITTED INFECTION): ICD-10-CM

## 2024-08-28 PROCEDURE — 87661 TRICHOMONAS VAGINALIS AMPLIF: CPT | Performed by: STUDENT IN AN ORGANIZED HEALTH CARE EDUCATION/TRAINING PROGRAM

## 2024-08-28 PROCEDURE — 87591 N.GONORRHOEAE DNA AMP PROB: CPT | Performed by: STUDENT IN AN ORGANIZED HEALTH CARE EDUCATION/TRAINING PROGRAM

## 2024-08-28 PROCEDURE — 87491 CHLMYD TRACH DNA AMP PROBE: CPT | Performed by: STUDENT IN AN ORGANIZED HEALTH CARE EDUCATION/TRAINING PROGRAM

## 2024-08-28 PROCEDURE — G0145 SCR C/V CYTO,THINLAYER,RESCR: HCPCS | Performed by: STUDENT IN AN ORGANIZED HEALTH CARE EDUCATION/TRAINING PROGRAM

## 2024-08-28 PROCEDURE — S0612 ANNUAL GYNECOLOGICAL EXAMINA: HCPCS | Performed by: STUDENT IN AN ORGANIZED HEALTH CARE EDUCATION/TRAINING PROGRAM

## 2024-08-28 PROCEDURE — 87563 M. GENITALIUM AMP PROBE: CPT | Performed by: STUDENT IN AN ORGANIZED HEALTH CARE EDUCATION/TRAINING PROGRAM

## 2024-08-28 NOTE — TELEPHONE ENCOUNTER
Patient called in again, she wants to schedule a surgical consult but for the [facility], unable to warm transfer. Please return call to schedule

## 2024-08-28 NOTE — TELEPHONE ENCOUNTER
Patient would like to schedule a surgical consult for possible weight lost surgery if meets the requirements. Please call patient to schedule appointment.

## 2024-08-28 NOTE — PROGRESS NOTES
[facility]   Annual Well Woman Exam  Rodriguez    ASSESSMENT & PLAN: Diane is a 24 y.o.  with normal gynecologic exam.    1.  Routine well woman exam done today.  2.  Cervical Cancer Screening: Pap with reflex HPV was done today.  Current ASCCP Guidelines reviewed.   3.  Diane accepted STD testing.  chlamydia, gonorrhea, trichomonas, and mycoplasma  testing performed. Not interseted in testing for HIV, RPR, Hep B/C at this time. Practices safe sex.  4.  Gardasil is recommended for patients from 9-45 years of age. Diane has had the Gardasil vaccine series.   5. Diane is sexually active. She uses condoms for contraception and is happy with this method  6. The following were reviewed in today's visit: breast self exam, STD testing, exercise, and healthy diet. Discussed HS. Interested in weight loss surgery, discussed benefits to health.  7. Return to office in 12 months for annual, sooner PRN.     All questions answered to the best of my ability.      Subjective:    CC:  Annual Gynecologic Examination    HPI: Diane Gomez is a 24 y.o.  who presents for annual gynecologic examination.      Screening:  Pap 2023: NILM  2020: NILM    Getting nausea, body aches a few days before period  Stopped OCP ([facility] .), since came off hasn't had those symptoms  Using condoms now, happy with method  Periods regular off the birth control, last 4-5 days, heavier in beginning, lighter towards the end  On IUD and would spot all the time    Sometimes gets BV, uses boric acid vaginal supp which help    Gardasil: received all three    OB       Breast  Complaints: denies  Denies: breast lump, nipple discharge, and skin lesion(s)    Family hx: some gyn cancer for maternal aunt and maternal gma--unsure if uterine or ovarian  denies fhx of breast or colon cancers  Patient does do regular self-exams    Health Maintenance:    She exercises: walks regularly, has lost almost 30 lbs with healthy diet  and regular exercise! Feels great!    She feels safe at home lives with cat and denies domestic violence  She does not use tobacco and drinks casually alcohol (1-2 days a week)  She does follow with a PCP.    Past Medical History:   Diagnosis Date    High cholesterol     Medical history reviewed with no changes        Past Surgical History:   Procedure Laterality Date    COLONOSCOPY W/ ENDOSCOPIC   2019    WISDOM TOOTH EXTRACTION  06/2021       Past OB/Gyn History:     Patient's last menstrual period was 08/21/2024 (approximate).    Family History:  Family History   Problem Relation Age of Onset    No Known Problems Mother     No Known Problems Father     Cancer Maternal Grandmother     Cancer Maternal Aunt         s/p partial hysterectomy but unsure type of cancer       Social History:  Social History     Socioeconomic History    Marital status: Single     Spouse name: Not on file    Number of children: Not on file    Years of education: Not on file    Highest education level: Not on file   Occupational History    Not on file   Tobacco Use    Smoking status: Never    Smokeless tobacco: Never   Vaping Use    Vaping status: Never Used   Substance and Sexual Activity    Alcohol use: Yes     Alcohol/week: 1.0 standard drink of alcohol     Types: 1 Glasses of wine per week     Comment: Casual alcohol assumption    Drug use: Never    Sexual activity: Yes     Partners: Male     Birth control/protection: None, Condom Male   Other Topics Concern    Not on file   Social History Narrative    Not on file     Social Determinants of Health     Financial Resource Strain: Not on file   Food Insecurity: Not on file   Transportation Needs: Not on file   Physical Activity: Not on file   Stress: Not on file   Social Connections: Not on file   Intimate Partner Violence: Not on file   Housing Stability: Not on file       No Known Allergies    Current Outpatient Medications:     montelukast (SINGULAIR) 10 mg tablet, Take 1 tablet (10  "mg total) by mouth daily after breakfast, Disp: 90 tablet, Rfl: 2    Review of Systems:  Denies chest pain, shortness of breath, abdominal pain, vaginal bleeding, vaginal discharge. All other systems negative unless otherwise stated.     Physical Exam:  /84 (BP Location: Right arm, Patient Position: Sitting)   Ht 5' 9.5\" (1.765 m)   Wt 114 kg (252 lb)   LMP 08/21/2024 (Approximate)   BMI 36.68 kg/m²  Body mass index is 36.68 kg/m².   GEN: The patient was alert and oriented x3, pleasant well-appearing female in no acute distress.   HEENT:  Unremarkable, no anterior or posterior lymphadenopathy, no thyromegaly  CV:  Regular rate and rhythm, normal S1 and S2, no murmurs  RESP:  Clear to auscultation bilaterally, no wheezes, rales or rhonchi  BREAST:  Symmetric breasts with no palpable breast masses or obvious breast lesions. She has no retractions or nipple discharge. She has no axillary abnormalities or palpable masses.   GI:  Soft, nontender, non-distended  MSK: bilateral lower extremities are nontender, no edema  : external female genitalia notable for scars from HS, otherwise normal, no lesions, normal appearing urethral meatus. On sterile speculum exam, normal appearing vaginal epithelium, no vaginal discharge, no bleeding, grossly normal appearing cervix. On bimanual exam, no cervical motion tenderness; uterus is smooth, mobile, nontender, normal weeks size. No tenderness or fullness in the bilateral adnexa.     "

## 2024-08-29 LAB
C TRACH DNA SPEC QL NAA+PROBE: NEGATIVE
M GENITALIUM DNA SPEC QL NAA+PROBE: NEGATIVE
N GONORRHOEA DNA SPEC QL NAA+PROBE: NEGATIVE
T VAGINALIS DNA SPEC QL NAA+PROBE: NEGATIVE

## 2024-09-04 LAB
LAB AP GYN PRIMARY INTERPRETATION: NORMAL
Lab: NORMAL

## 2024-09-25 ENCOUNTER — TELEPHONE (OUTPATIENT)
Age: 24
End: 2024-09-25

## 2024-09-25 NOTE — TELEPHONE ENCOUNTER
Dear Diane,     I hope this message finds you well. Your pap is normal.     Please call the office or reach out via [employer] with any questions.     Best,  Dr. Rodriguez       Spoke with patient regarding above message. No further questions or concerns at this time.

## 2024-10-08 ENCOUNTER — CONSULT (OUTPATIENT)
Dept: BARIATRICS | Facility: CLINIC | Age: 24
End: 2024-10-08
Payer: COMMERCIAL

## 2024-10-08 ENCOUNTER — TELEPHONE (OUTPATIENT)
Dept: BARIATRICS | Facility: CLINIC | Age: 24
End: 2024-10-08

## 2024-10-08 VITALS
BODY MASS INDEX: 35.96 KG/M2 | DIASTOLIC BLOOD PRESSURE: 78 MMHG | WEIGHT: 251.2 LBS | RESPIRATION RATE: 16 BRPM | HEIGHT: 70 IN | HEART RATE: 85 BPM | SYSTOLIC BLOOD PRESSURE: 140 MMHG

## 2024-10-08 DIAGNOSIS — K21.9 GASTROESOPHAGEAL REFLUX DISEASE WITHOUT ESOPHAGITIS: ICD-10-CM

## 2024-10-08 DIAGNOSIS — E66.812 OBESITY, CLASS II, BMI 35-39.9: Primary | ICD-10-CM

## 2024-10-08 PROCEDURE — 99204 OFFICE O/P NEW MOD 45 MIN: CPT | Performed by: SURGERY

## 2024-10-08 RX ORDER — TIRZEPATIDE 2.5 MG/.5ML
2.5 INJECTION, SOLUTION SUBCUTANEOUS WEEKLY
Qty: 2 ML | Refills: 0 | Status: SHIPPED | OUTPATIENT
Start: 2024-10-08 | End: 2024-11-05

## 2024-10-08 NOTE — LETTER
October 8, 2024     Judah Henao MD  [address] [facility]  [address] [place] [id number]    Patient: Diane Gomez   YOB: 2000   Date of Visit: 10/8/2024       Dear Dr. Henao:    Thank you for referring Diane Gomez to me for evaluation. Below are my notes for this consultation.    If you have questions, please do not hesitate to call me. I look forward to following your patient along with you.         Sincerely,        Martha Vang MD        CC: No Recipients    Martha Vang MD  10/8/2024  9:00 AM  Sign when Signing Visit      BARIATRIC INITIAL CONSULT - BARIATRIC SURGERY    Diane Gmoez 24 y.o. female MRN: [id number]  Unit/Bed#:  Encounter: [id number]      HPI:  Diane Gomez is a 24 y.o. female who presents with a longstanding history of morbid obesity and inability to sustain a meaningful weight loss.  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.  She desires to pursue weight loss options to improve her health.  Positive GERD.  Denies DVT/PE.  Here today to discuss bariatric options.    Visit type: initial visit    Symptoms: excess weight    Associated Symptoms: none    Associated Conditions: glucose intolerance and hyperlipidemia  Disease Complications: none  Weight Loss Interest: high  Previous Diet Trials: low carb    Exercise Frequency:daily  Types of Exercise: Walking      Review of Systems   Gastrointestinal:         GERD   Musculoskeletal:  Negative for arthralgias.   All other systems reviewed and are negative.      Historical Information  Past Medical History:   Diagnosis Date   • High cholesterol    • Medical history reviewed with no changes      Past Surgical History:   Procedure Laterality Date   • COLONOSCOPY W/ ENDOSCOPIC US  2019   • WISDOM TOOTH EXTRACTION  06/2021     Social History  Social History     Substance and Sexual Activity   Alcohol Use Yes   • Alcohol/week: 1.0 standard drink of alcohol   • Types: 1 Glasses of wine per  "week    Comment: Casual alcohol assumption     Social History     Substance and Sexual Activity   Drug Use Never     Social History     Tobacco Use   Smoking Status Never   Smokeless Tobacco Never     Family History: Family history non-contributory    Meds/Allergies  all medications and allergies reviewed  No Known Allergies    Objective      Current Vitals:   /78 (BP Location: Left arm, Patient Position: Sitting, Cuff Size: Large)   Pulse 85   Resp 16   Ht 5' 9.53\" (1.766 m)   Wt 114 kg (251 lb 3.2 oz)   BMI 36.53 kg/m²       Invasive Devices       None                   Physical Exam  Constitutional:       Appearance: Normal appearance.   HENT:      Head: Atraumatic.      Nose: No rhinorrhea.   Eyes:      Extraocular Movements: Extraocular movements intact.   Cardiovascular:      Rate and Rhythm: Normal rate.   Pulmonary:      Effort: Pulmonary effort is normal. No respiratory distress.   Abdominal:      General: Abdomen is flat. There is no distension.   Musculoskeletal:         General: Normal range of motion.      Cervical back: Normal range of motion.   Skin:     General: Skin is warm and dry.   Neurological:      General: No focal deficit present.      Mental Status: She is alert and oriented to person, place, and time.   Psychiatric:         Mood and Affect: Mood normal.         Behavior: Behavior normal.         Lab Results: I have personally reviewed pertinent lab results.    Imaging: Results Review Statement: No pertinent imaging studies reviewed.  EKG, Pathology, and Other Studies: Results Review Statement: No pertinent imaging studies reviewed.      Assessment/PLAN:    24 y.o. yo female with a long standing h/o of obesity and inability to sustain any meaningful weight loss on her own despite several attempts.    She is interested in obesity medicine.    Will start Zepbound.  Risk, benefits, alternatives explained.    She was given the opportunity to ask questions and I have answered all of " them.  I have discussed and educated the patient with regards to the components of our multidisciplinary program and the importance of compliance and follow up The patient was also instructed with regards to the importance of behavior modification, nutritional counseling, support meeting attendance and lifestyle changes that are important to ensure success.     Although there is a great statistical chance of improvement or even resolution of most of her associated comorbidities, the results vary from patient to patient and they largely depend on her commitment and compliance.     Prior Authorization Clinical Questions for Weight Management Pharmacotherapy     Does the patient have a contraindication to medication prescribed for weight management? no  Does the patient have a diagnosis of obesity, confirmed by a BMI greater than or equal to 30 kg/m^2? yes  GERD and hyperlipidemia  Has the patient been on a weight loss regimen of low-calorie diet, increased physical activity, and lifestyle modifications for a minimum of 6 months? yes  Is the medication a controlled substance? no    Martha Vang MD  10/8/2024  8:56 AM

## 2024-10-08 NOTE — PROGRESS NOTES
"    BARIATRIC INITIAL CONSULT - BARIATRIC SURGERY    Diane Gomez 24 y.o. female MRN: [id number]  Unit/Bed#:  Encounter: [id number]      HPI:  Diane Gomez is a 24 y.o. female who presents with a longstanding history of morbid obesity and inability to sustain a meaningful weight loss.  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  She desires to pursue weight loss options to improve her health.  Positive GERD.  Denies DVT/PE.  Here today to discuss bariatric options.    Visit type: initial visit    Symptoms: excess weight    Associated Symptoms: none    Associated Conditions: glucose intolerance and hyperlipidemia  Disease Complications: none  Weight Loss Interest: high  Previous Diet Trials: low carb    Exercise Frequency:daily  Types of Exercise: Walking      Review of Systems   Gastrointestinal:         GERD   Musculoskeletal:  Negative for arthralgias.   All other systems reviewed and are negative.      Historical Information   Past Medical History:   Diagnosis Date    High cholesterol     Medical history reviewed with no changes      Past Surgical History:   Procedure Laterality Date    COLONOSCOPY W/ ENDOSCOPIC US  2019    WISDOM TOOTH EXTRACTION  06/2021     Social History   Social History     Substance and Sexual Activity   Alcohol Use Yes    Alcohol/week: 1.0 standard drink of alcohol    Types: 1 Glasses of wine per week    Comment: Casual alcohol assumption     Social History     Substance and Sexual Activity   Drug Use Never     Social History     Tobacco Use   Smoking Status Never   Smokeless Tobacco Never     Family History: Family history non-contributory    Meds/Allergies   all medications and allergies reviewed  No Known Allergies    Objective       Current Vitals:   /78 (BP Location: Left arm, Patient Position: Sitting, Cuff Size: Large)   Pulse 85   Resp 16   Ht 5' 9.53\" (1.766 m)   Wt 114 kg (251 lb 3.2 oz)   BMI 36.53 kg/m²       Invasive Devices       None     "               Physical Exam  Constitutional:       Appearance: Normal appearance.   HENT:      Head: Atraumatic.      Nose: No rhinorrhea.   Eyes:      Extraocular Movements: Extraocular movements intact.   Cardiovascular:      Rate and Rhythm: Normal rate.   Pulmonary:      Effort: Pulmonary effort is normal. No respiratory distress.   Abdominal:      General: Abdomen is flat. There is no distension.   Musculoskeletal:         General: Normal range of motion.      Cervical back: Normal range of motion.   Skin:     General: Skin is warm and dry.   Neurological:      General: No focal deficit present.      Mental Status: She is alert and oriented to person, place, and time.   Psychiatric:         Mood and Affect: Mood normal.         Behavior: Behavior normal.         Lab Results: I have personally reviewed pertinent lab results.    Imaging: Results Review Statement: No pertinent imaging studies reviewed.  EKG, Pathology, and Other Studies: Results Review Statement: No pertinent imaging studies reviewed.      Assessment/PLAN:    24 y.o. yo female with a long standing h/o of obesity and inability to sustain any meaningful weight loss on her own despite several attempts.    She is interested in obesity medicine.    Will start Zepbound.  Risk, benefits, alternatives explained.    She was given the opportunity to ask questions and I have answered all of them.  I have discussed and educated the patient with regards to the components of our multidisciplinary program and the importance of compliance and follow up The patient was also instructed with regards to the importance of behavior modification, nutritional counseling, support meeting attendance and lifestyle changes that are important to ensure success.     Although there is a great statistical chance of improvement or even resolution of most of her associated comorbidities, the results vary from patient to patient and they largely depend on her commitment and  compliance.     Prior Authorization Clinical Questions for Weight Management Pharmacotherapy     Does the patient have a contraindication to medication prescribed for weight management? no  Does the patient have a diagnosis of obesity, confirmed by a BMI greater than or equal to 30 kg/m^2? yes  GERD and hyperlipidemia  Has the patient been on a weight loss regimen of low-calorie diet, increased physical activity, and lifestyle modifications for a minimum of 6 months? yes  Is the medication a controlled substance? no    Martha Vang MD  10/8/2024  8:56 AM

## 2024-10-14 ENCOUNTER — TELEPHONE (OUTPATIENT)
Dept: BARIATRICS | Facility: CLINIC | Age: 24
End: 2024-10-14

## 2024-10-14 ENCOUNTER — TELEPHONE (OUTPATIENT)
Age: 24
End: 2024-10-14

## 2024-10-14 NOTE — TELEPHONE ENCOUNTER
Patient called wantted to inform th adam that she doesn't want to partivipate in an medical or dietician appt's and would like to pursue the process of surgical.

## 2024-10-14 NOTE — TELEPHONE ENCOUNTER
Patient called requesting to reschedule appointment from wt medical management to wt management surgical. Warm transferred to [facility] for further assistance.

## 2024-10-15 ENCOUNTER — TELEPHONE (OUTPATIENT)
Dept: BARIATRICS | Facility: CLINIC | Age: 24
End: 2024-10-15

## 2024-10-15 NOTE — TELEPHONE ENCOUNTER
Called to r/s the patient appt. for 11/27 w/ Dr. Galdamez , and to get her scheduled for the ins. Coordinator for the Eval process.No answer Joellen.

## 2024-10-16 NOTE — TELEPHONE ENCOUNTER
Patient calling back to speak with Wendi about setting up  appt     Tried calling office but line was busy     Please call patient back to assist

## 2024-10-16 NOTE — TELEPHONE ENCOUNTER
Called [facility] to offer the ins coordinator appt. For the 24 of Oct at 8am,9am, or 2pm...

## 2024-10-16 NOTE — TELEPHONE ENCOUNTER
Patient returning call re: patient would like the  appointment for 10/24 at 8am. Please send confirm via [employer].

## 2024-10-24 ENCOUNTER — CLINICAL SUPPORT (OUTPATIENT)
Dept: BARIATRICS | Facility: CLINIC | Age: 24
End: 2024-10-24

## 2024-10-24 DIAGNOSIS — Z01.818 PREOPERATIVE CLEARANCE: ICD-10-CM

## 2024-10-24 DIAGNOSIS — E66.812 OBESITY, CLASS II, BMI 35-39.9: Primary | ICD-10-CM

## 2024-10-24 PROCEDURE — RECHECK

## 2024-10-30 ENCOUNTER — OFFICE VISIT (OUTPATIENT)
Dept: INTERNAL MEDICINE CLINIC | Facility: CLINIC | Age: 24
End: 2024-10-30
Payer: COMMERCIAL

## 2024-10-30 VITALS
BODY MASS INDEX: 36.22 KG/M2 | WEIGHT: 253 LBS | HEIGHT: 70 IN | DIASTOLIC BLOOD PRESSURE: 72 MMHG | HEART RATE: 88 BPM | SYSTOLIC BLOOD PRESSURE: 110 MMHG | OXYGEN SATURATION: 99 %

## 2024-10-30 DIAGNOSIS — J31.0 CHRONIC RHINITIS: ICD-10-CM

## 2024-10-30 DIAGNOSIS — E66.01 MORBID (SEVERE) OBESITY DUE TO EXCESS CALORIES (HCC): Primary | ICD-10-CM

## 2024-10-30 PROCEDURE — 99213 OFFICE O/P EST LOW 20 MIN: CPT | Performed by: INTERNAL MEDICINE

## 2024-10-30 NOTE — ASSESSMENT & PLAN NOTE
BMI 36.79 associated with hyperlipidemia and borderline blood sugar asthma been trying diet exercise almost for last 6 months    Vies low calorie low of portion diet change in lifestyle    ~Done as follows    BMI Counseling:      The BMI is above normal. Know Body weight goal    Weigh yourself daily or weekly as per your doctor    Nutrition recommendations include decreasing portion sizes, encouraging healthy choices of fruits and vegetables, decreasing   Patient followed at weight loss center advise gastric bypass  fast food intake, consuming healthier snacks, limiting drinks that contain sugar, moderation in carbohydrate intake, increasing     intake of lean protein, reducing intake of saturated and trans fat and reducing intake of cholesterol  Discussed options of HealthyCORE-Intensive Lifestyle Intervention Program, Very Low Calorie Diet-VLCD and Conservative Program and the role of weight loss medications.  - Explained the importance of making lifestyle changes in addition to starting anti-obesity medications.   -

## 2024-10-30 NOTE — PATIENT INSTRUCTIONS
Patient Education     Obesity Discharge Instructions, Adult   About this topic   Obesity is a health problem where your weight is higher than it should be. Doctors use a method called body mass index or BMI to measure whether you are at a healthy weight for your height. The doctor uses your weight and your height to determine your BMI. A high BMI can be an indicator of high body fat. Obesity is different from being overweight. Being overweight means your BMI is 25 or higher. Being obese means your BMI is 30 or higher. If your BMI is 40 or higher, you are considered severely or morbidly obese. Being obese may lead to many health problems. It may make it hard for you to breathe and move easily. It may raise your risk for illnesses like high blood sugar and heart disease.  What care is needed at home?   Ask your doctor what you need to do when you go home. Make sure you understand everything the doctor says. This way you will know what you need to do.  Change your diet. Lower the calories in your diet. Ask your dietitian to help you set an eating plan that is right for you.  Get enough exercise. Talk to your doctor about the right amount of exercise for you.  Do not smoke.  Limit the amount of beer, wine, and mixed drinks (alcohol) you drink.  Keep a record of your weight. Write down what you eat and drink each day. This may help you be more aware of the choices you are making.  What drugs may be needed?   The doctor may order drugs to:  Treat health problems that may cause weight gain  Lower your appetite  Help you lose weight  Will physical activity be limited?   Talk to your doctor about the right amount of exercise for you. This is especially important if you have heart problems, other illnesses, or if you recently had surgery.  Start slowly by doing more everyday activities like yard work or household chores.  Choose activities that you like to do.  What changes to diet are needed?   Whole grains are a good source  of carbohydrates and fiber. Try to eat 3 to 5 servings of whole grain, high fiber foods each day. These are things like whole grain bread, bran cereals, brown rice, and whole wheat pasta.  Fruits and vegetables are good sources of fiber, vitamins, and minerals. Try to pick many kinds and colors. This will help you get different nutrients in your diet. Choose fresh, frozen, or canned fruits and vegetables. Buy plain, frozen fruits without added sugar. Buy plain, frozen vegetables without added salt and fat. Buy canned fruit in 100% juice or water. Avoid canned fruit in syrups. Buy canned vegetables with no salt added.  Milk is a good source of protein and some vitamins and minerals. Choose low-fat (1%) or fat-free milk. Eat nonfat or low-fat cheeses, ice creams, yogurt, and other dairy products.  Meats and beans are good sources of protein and iron. Eat more low-fat or lean meats like chicken without the skin, turkey without the skin, and fish. Eggs and peanut butter are good sources of protein as well. Dried peas, beans, and lentils are also good and contain fiber. Fatty fish, like salmon, tuna, mackerel, herring, and trout, are good to eat and have healthy omega-3 fats.  Good fats can give you long-term energy. These are found in fish, nuts, seeds, and avocados. Try using olive oil, safflower oil, and low-sodium, low-fat salad dressing as a topping on foods. Use canola, olive, or peanut oil for cooking. Other healthy oils include corn, sunflower, and soybean oils.  Limit sweets such as candy and sugary drinks. Try to drink water instead. Drink diet or no calorie beverages when you want something other than water.  Cut back on solid fats, like butter, lard, and coconut oil.  Limit fatty foods such as desserts, fried foods, and chips.  Trans fats should be avoided. Most trans fats are found in processed foods, commercially baked goods, and fried foods and are very unhealthy. Saturated fat, which is different from  trans fat, should be watched and limited if portions are too large. Saturated fat is found mainly in animal sources, such as meat and dairy products. Saturated fat is also found in coconut and palm oils.  Limit processed meats and most processed foods.  Limit eating out. If you choose to visit a restaurant, ask for the nutritional facts. You may also be able to find nutritional facts online. Then, you can make a plan and choose healthy items. Watch the portion size. Have large portions split and take part home for some other meal.  What problems could happen?   Low mood or self-esteem  Anxiety  Muscle pain, joint pain, or arthritis  Sleep apnea  Diabetes  High blood pressure  High cholesterol  Heart, lung, or breathing problems  Kidney or liver problems  Cancer  Gallstones  Increased sweating  Reduced fertility  Pregnancy complications  Gastroesophageal reflux disease  When do I need to call the doctor?   Signs of high or low blood sugar  Thoughts of hurting yourself  Teach Back: Helping You Understand   The Teach Back Method helps you understand the information we are giving you. After you talk with the staff, tell them in your own words what you learned. This helps to make sure the staff has described each thing clearly. It also helps to explain things that may have been confusing. Before going home, make sure you can do these:  I can tell you about my condition.  I can tell you what changes I need to make with my diet or drugs.  I can tell you what I will do if I have signs of a heart attack or stroke.  Last Reviewed Date   2021-06-23  Consumer Information Use and Disclaimer   This generalized information is a limited summary of diagnosis, treatment, and/or medication information. It is not meant to be comprehensive and should be used as a tool to help the user understand and/or assess potential diagnostic and treatment options. It does NOT include all information about conditions, treatments, medications, side  effects, or risks that may apply to a specific patient. It is not intended to be medical advice or a substitute for the medical advice, diagnosis, or treatment of a health care provider based on the health care provider's examination and assessment of a patient’s specific and unique circumstances. Patients must speak with a health care provider for complete information about their health, medical questions, and treatment options, including any risks or benefits regarding use of medications. This information does not endorse any treatments or medications as safe, effective, or approved for treating a specific patient. [employer] and its affiliates disclaim any warranty or liability relating to this information or the use thereof. The use of this information is governed by the Terms of Use, available at https://www.woltersLagiaruwer.com/en/know/clinical-effectiveness-terms   Copyright   Copyright © 2024 [employer] and its affiliates and/or licensors. All rights reserved.

## 2024-10-30 NOTE — ASSESSMENT & PLAN NOTE
Persistent nasal congestion more than 6 months off and on been using nasal spray and over-the-counter Claritin with no response difficulty breathing through the nose and thick yellowish discharge intermittently off and on    Before any treatment patient requested to be evaluated by ENT referral given to the patient

## 2024-10-30 NOTE — PROGRESS NOTES
Dr. Henao's Office Visit Note  10/30/24     Diane Gomez 24 y.o. female MRN: [id number]  : 2000    Assessment:     1. Morbid (severe) obesity due to excess calories (HCC)  -     Ambulatory Referral to Weight Management; Future  2. Chronic rhinitis  -     Ambulatory Referral to Otolaryngology; Future        Discussion Summary and Plan:  Today's care plan and medications were reviewed with patient in detail and all their questions answered to their satisfaction.    Chief Complaint   Patient presents with    Follow-up     Go over labs.      Subjective:  Came in for follow-up chronic medical condition list visit diagnosis mainly referral for gastric bypass followed a weight loss center able to lose some weight with diet exercise and persistent nasal congestion nasal discharge        The following portions of the patient's history were reviewed and updated as appropriate: allergies, current medications, past family history, past medical history, past social history, past surgical history and problem list.    Review of Systems   Constitutional:  Negative for activity change, appetite change, chills, diaphoresis, fatigue, fever and unexpected weight change.   HENT:  Positive for congestion. Negative for dental problem, drooling, ear discharge, ear pain, facial swelling, hearing loss, mouth sores, nosebleeds, postnasal drip, rhinorrhea, sinus pressure, sneezing, sore throat, tinnitus, trouble swallowing and voice change.    Eyes:  Negative for photophobia, pain, discharge, redness, itching and visual disturbance.   Respiratory:  Negative for apnea, cough, choking, chest tightness, shortness of breath, wheezing and stridor.    Cardiovascular:  Negative for chest pain, palpitations and leg swelling.   Gastrointestinal:  Negative for abdominal distention, abdominal pain, anal bleeding, blood in stool, constipation, diarrhea, nausea, rectal pain and vomiting.   Endocrine: Negative for cold intolerance, heat  intolerance, polydipsia, polyphagia and polyuria.   Genitourinary:  Negative for decreased urine volume, difficulty urinating, dysuria, enuresis, flank pain, frequency, genital sores, hematuria and urgency.   Musculoskeletal:  Negative for arthralgias, back pain, gait problem, joint swelling, myalgias, neck pain and neck stiffness.   Skin:  Negative for color change, pallor, rash and wound.   Allergic/Immunologic: Negative.  Negative for environmental allergies, food allergies and immunocompromised state.   Neurological:  Negative for dizziness, tremors, seizures, syncope, facial asymmetry, speech difficulty, weakness, light-headedness, numbness and headaches.   Psychiatric/Behavioral:  Negative for agitation, behavioral problems, confusion, decreased concentration, dysphoric mood, hallucinations, self-injury, sleep disturbance and suicidal ideas. The patient is not nervous/anxious and is not hyperactive.          Historical Information   Patient Active Problem List   Diagnosis    Seasonal allergic rhinitis due to pollen    Mixed hyperlipidemia    Morbid (severe) obesity due to excess calories (HCC)    Gastroesophageal reflux disease without esophagitis     Past Medical History:   Diagnosis Date    High cholesterol     Medical history reviewed with no changes      Past Surgical History:   Procedure Laterality Date    COLONOSCOPY W/ ENDOSCOPIC   2019    WISDOM TOOTH EXTRACTION  06/2021     Social History     Substance and Sexual Activity   Alcohol Use Yes    Alcohol/week: 1.0 standard drink of alcohol    Types: 1 Glasses of wine per week    Comment: Casual alcohol assumption     Social History     Substance and Sexual Activity   Drug Use Never     Social History     Tobacco Use   Smoking Status Never   Smokeless Tobacco Never     Family History   Problem Relation Age of Onset    No Known Problems Mother     No Known Problems Father     Cancer Maternal Aunt         s/p partial hysterectomy but unsure type of cancer  "   Cancer Maternal Grandmother     COPD Maternal Grandmother      Health Maintenance Due   Topic    Hepatitis C Screening     HPV Vaccine (2 - 2-dose series)    HIV Screening     DTaP,Tdap,and Td Vaccines (1 - Tdap)    Influenza Vaccine (1)    COVID-19 Vaccine (1 - 2023-24 season)      Meds/Allergies       Current Outpatient Medications:     montelukast (SINGULAIR) 10 mg tablet, Take 1 tablet (10 mg total) by mouth daily after breakfast, Disp: 90 tablet, Rfl: 2      Objective:    Vitals:   /72   Pulse 88   Ht 5' 9.53\" (1.766 m)   Wt 115 kg (253 lb)   SpO2 99%   BMI 36.79 kg/m²   Body mass index is 36.79 kg/m².  Vitals:    10/30/24 1650   Weight: 115 kg (253 lb)       Physical Exam  Vitals and nursing note reviewed.   Constitutional:       General: She is not in acute distress.     Appearance: She is well-developed. She is obese. She is not ill-appearing, toxic-appearing or diaphoretic.   HENT:      Head: Normocephalic and atraumatic.      Right Ear: External ear normal.      Left Ear: External ear normal.      Nose: Congestion present.      Mouth/Throat:      Pharynx: No oropharyngeal exudate.   Eyes:      General: Lids are normal. Lids are everted, no foreign bodies appreciated. No scleral icterus.        Right eye: No discharge.         Left eye: No discharge.      Conjunctiva/sclera: Conjunctivae normal.      Pupils: Pupils are equal, round, and reactive to light.   Neck:      Thyroid: No thyromegaly.      Vascular: Normal carotid pulses. No carotid bruit, hepatojugular reflux or JVD.      Trachea: No tracheal tenderness or tracheal deviation.   Cardiovascular:      Rate and Rhythm: Normal rate and regular rhythm.      Pulses: Normal pulses.      Heart sounds: Normal heart sounds. No murmur heard.     No friction rub. No gallop.   Pulmonary:      Effort: Pulmonary effort is normal. No respiratory distress.      Breath sounds: Normal breath sounds. No stridor. No wheezing or rales.   Chest:      Chest " wall: No tenderness.   Abdominal:      General: Bowel sounds are normal. There is no distension.      Palpations: Abdomen is soft. There is no mass.      Tenderness: There is no abdominal tenderness. There is no guarding or rebound.   Musculoskeletal:         General: No tenderness or deformity. Normal range of motion.      Cervical back: Normal range of motion and neck supple. No edema, erythema or rigidity. No spinous process tenderness or muscular tenderness. Normal range of motion.   Lymphadenopathy:      Head:      Right side of head: No submental, submandibular, tonsillar, preauricular or posterior auricular adenopathy.      Left side of head: No submental, submandibular, tonsillar, preauricular, posterior auricular or occipital adenopathy.      Cervical: No cervical adenopathy.      Right cervical: No superficial, deep or posterior cervical adenopathy.     Left cervical: No superficial, deep or posterior cervical adenopathy.      Upper Body:      Right upper body: No pectoral adenopathy.      Left upper body: No pectoral adenopathy.   Skin:     General: Skin is warm and dry.      Coloration: Skin is not pale.      Findings: No erythema or rash.   Neurological:      Mental Status: She is alert and oriented to person, place, and time.      Cranial Nerves: No cranial nerve deficit.      Sensory: No sensory deficit.      Motor: No tremor, abnormal muscle tone or seizure activity.      Coordination: Coordination normal.      Gait: Gait normal.      Deep Tendon Reflexes: Reflexes are normal and symmetric. Reflexes normal.   Psychiatric:         Behavior: Behavior normal.         Thought Content: Thought content normal.         Judgment: Judgment normal.         Lab Review   No visits with results within 2 Month(s) from this visit.   Latest known visit with results is:   Annual Exam on 08/28/2024   Component Date Value Ref Range Status    N gonorrhoeae, DNA Probe 08/28/2024 Negative  Negative Final    Chlamydia  trachomatis, DNA Probe 08/28/2024 Negative  Negative Final    Trichomonas vaginalis 08/28/2024 Negative  Negative Final    Mycoplasma genitalium 08/28/2024 Negative  Negative Final    Case Report 08/28/2024    Final                    Value:Gynecologic Cytology Report                       Case: [id number]                                  Authorizing Provider:  Rebecca Rodriguez MD  Collected:           08/28/2024 1646              Ordering Location:     [facility] Received:            08/28/2024 1646                                     OB/GYN [place]                                                                First Screen:          Marlene Edyodalis                                                                Specimen:    LIQUID-BASED PAP, SCREENING, Cervix                                                        Primary Interpretation 08/28/2024 Negative for intraepithelial lesion or malignancy   Final    Specimen Adequacy 08/28/2024 Satisfactory for evaluation. Absence of endocervical/transformation zone component.   Final    Additional Information 08/28/2024    Final                    Value:[employer]'s FDA approved ,  and ThinPrep Imaging Duo System are utilized with strict adherence to the 's instruction manual to prepare gynecologic and non-gynecologic cytology specimens for the production of ThinPrep slides as well as for gynecologic ThinPrep imaging. These processes have been validated by our laboratory and/or by the .  The Pap test is not a diagnostic procedure and should not be used as the sole means to detect cervical cancer. It is only a screening procedure to aid in the detection of cervical cancer and its precursors. Both false-negative and false-positive results have been experienced. Your patient's test result should be interpreted in this context together with the history and clinical findings.      Gross Description 08/28/2024    Final                     Value:A. 20 ml , colorless, cloudy received in a ThinPrep vial.           Patient Instructions   Patient Education     Obesity Discharge Instructions, Adult   About this topic   Obesity is a health problem where your weight is higher than it should be. Doctors use a method called body mass index or BMI to measure whether you are at a healthy weight for your height. The doctor uses your weight and your height to determine your BMI. A high BMI can be an indicator of high body fat. Obesity is different from being overweight. Being overweight means your BMI is 25 or higher. Being obese means your BMI is 30 or higher. If your BMI is 40 or higher, you are considered severely or morbidly obese. Being obese may lead to many health problems. It may make it hard for you to breathe and move easily. It may raise your risk for illnesses like high blood sugar and heart disease.  What care is needed at home?   Ask your doctor what you need to do when you go home. Make sure you understand everything the doctor says. This way you will know what you need to do.  Change your diet. Lower the calories in your diet. Ask your dietitian to help you set an eating plan that is right for you.  Get enough exercise. Talk to your doctor about the right amount of exercise for you.  Do not smoke.  Limit the amount of beer, wine, and mixed drinks (alcohol) you drink.  Keep a record of your weight. Write down what you eat and drink each day. This may help you be more aware of the choices you are making.  What drugs may be needed?   The doctor may order drugs to:  Treat health problems that may cause weight gain  Lower your appetite  Help you lose weight  Will physical activity be limited?   Talk to your doctor about the right amount of exercise for you. This is especially important if you have heart problems, other illnesses, or if you recently had surgery.  Start slowly by doing more everyday activities like yard work or household  chores.  Choose activities that you like to do.  What changes to diet are needed?   Whole grains are a good source of carbohydrates and fiber. Try to eat 3 to 5 servings of whole grain, high fiber foods each day. These are things like whole grain bread, bran cereals, brown rice, and whole wheat pasta.  Fruits and vegetables are good sources of fiber, vitamins, and minerals. Try to pick many kinds and colors. This will help you get different nutrients in your diet. Choose fresh, frozen, or canned fruits and vegetables. Buy plain, frozen fruits without added sugar. Buy plain, frozen vegetables without added salt and fat. Buy canned fruit in 100% juice or water. Avoid canned fruit in syrups. Buy canned vegetables with no salt added.  Milk is a good source of protein and some vitamins and minerals. Choose low-fat (1%) or fat-free milk. Eat nonfat or low-fat cheeses, ice creams, yogurt, and other dairy products.  Meats and beans are good sources of protein and iron. Eat more low-fat or lean meats like chicken without the skin, turkey without the skin, and fish. Eggs and peanut butter are good sources of protein as well. Dried peas, beans, and lentils are also good and contain fiber. Fatty fish, like salmon, tuna, mackerel, herring, and trout, are good to eat and have healthy omega-3 fats.  Good fats can give you long-term energy. These are found in fish, nuts, seeds, and avocados. Try using olive oil, safflower oil, and low-sodium, low-fat salad dressing as a topping on foods. Use canola, olive, or peanut oil for cooking. Other healthy oils include corn, sunflower, and soybean oils.  Limit sweets such as candy and sugary drinks. Try to drink water instead. Drink diet or no calorie beverages when you want something other than water.  Cut back on solid fats, like butter, lard, and coconut oil.  Limit fatty foods such as desserts, fried foods, and chips.  Trans fats should be avoided. Most trans fats are found in processed  foods, commercially baked goods, and fried foods and are very unhealthy. Saturated fat, which is different from trans fat, should be watched and limited if portions are too large. Saturated fat is found mainly in animal sources, such as meat and dairy products. Saturated fat is also found in coconut and palm oils.  Limit processed meats and most processed foods.  Limit eating out. If you choose to visit a restaurant, ask for the nutritional facts. You may also be able to find nutritional facts online. Then, you can make a plan and choose healthy items. Watch the portion size. Have large portions split and take part home for some other meal.  What problems could happen?   Low mood or self-esteem  Anxiety  Muscle pain, joint pain, or arthritis  Sleep apnea  Diabetes  High blood pressure  High cholesterol  Heart, lung, or breathing problems  Kidney or liver problems  Cancer  Gallstones  Increased sweating  Reduced fertility  Pregnancy complications  Gastroesophageal reflux disease  When do I need to call the doctor?   Signs of high or low blood sugar  Thoughts of hurting yourself  Teach Back: Helping You Understand   The Teach Back Method helps you understand the information we are giving you. After you talk with the staff, tell them in your own words what you learned. This helps to make sure the staff has described each thing clearly. It also helps to explain things that may have been confusing. Before going home, make sure you can do these:  I can tell you about my condition.  I can tell you what changes I need to make with my diet or drugs.  I can tell you what I will do if I have signs of a heart attack or stroke.  Last Reviewed Date   2021-06-23  Consumer Information Use and Disclaimer   This generalized information is a limited summary of diagnosis, treatment, and/or medication information. It is not meant to be comprehensive and should be used as a tool to help the user understand and/or assess potential  "diagnostic and treatment options. It does NOT include all information about conditions, treatments, medications, side effects, or risks that may apply to a specific patient. It is not intended to be medical advice or a substitute for the medical advice, diagnosis, or treatment of a health care provider based on the health care provider's examination and assessment of a patient’s specific and unique circumstances. Patients must speak with a health care provider for complete information about their health, medical questions, and treatment options, including any risks or benefits regarding use of medications. This information does not endorse any treatments or medications as safe, effective, or approved for treating a specific patient. [employer] and its affiliates disclaim any warranty or liability relating to this information or the use thereof. The use of this information is governed by the Terms of Use, available at https://www.[employer].com/en/know/clinical-effectiveness-terms   Copyright   Copyright © 2024 [employer] and its affiliates and/or licensors. All rights reserved.       Judah Henao MD        \"This note has been constructed using a voice recognition system.Therefore there may be syntax, spelling, and/or grammatical errors. Please call if you have any questions. \"  "

## 2024-11-13 ENCOUNTER — CLINICAL SUPPORT (OUTPATIENT)
Dept: BARIATRICS | Facility: CLINIC | Age: 24
End: 2024-11-13

## 2024-11-13 VITALS — BODY MASS INDEX: 36.85 KG/M2 | WEIGHT: 253.4 LBS

## 2024-11-13 DIAGNOSIS — E66.812 OBESITY, CLASS II, BMI 35-39.9: Primary | ICD-10-CM

## 2024-11-13 DIAGNOSIS — E78.2 MIXED HYPERLIPIDEMIA: ICD-10-CM

## 2024-11-13 DIAGNOSIS — E66.812 OBESITY, CLASS II, BMI 35-39.9: ICD-10-CM

## 2024-11-13 DIAGNOSIS — K21.9 GASTROESOPHAGEAL REFLUX DISEASE WITHOUT ESOPHAGITIS: Primary | ICD-10-CM

## 2024-11-13 DIAGNOSIS — Z01.818 PREOP TESTING: ICD-10-CM

## 2024-11-13 PROCEDURE — RECHECK

## 2024-11-13 NOTE — PROGRESS NOTES
Bariatric Behavioral Health Evaluation    Presenting Problem:  Diane Gomez  is a 24 y.o.   female   :  2000   Patient wants to have the surgery to lose weight and to improve health. She is experiencing some health issues such as pre-diabetes and higher cholesterol so she wants to manage these conditions to have a healthier life. She has struggled throughout her life with weight, she has tried to lose weight by increasing water intake, going for walks, reducing her stress, reducing portions, she is losing some weight but not sure if it will continue. She is considering the surgery as a tool to help with this.    Is the patient seeking Bariatric Surgery Eval?  Yes   If yes, how long has patient been considering, researching and/or making changes for surgery? Patient has been considering the surgery for the past few months, she knows a few people who have had the surgery and she's seen their success which has motivated her to consider surgery for herself. She has been doing some research about the procedure.      Realizes Post-Op Requirements? Yes      Pre-morbid level of function and history of present illness: GERD, hyperlipidemia, degenerative disc disorder     Living situation: Patient lives alone, she has family living near by who are supportive. She was living with her boyfriend but they broke up about 5 months ago, that was a major stress reduction for her. She feels safe in her home.    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 She likes her job, she enjoys the people she works with, some stress with some people she has to work with and the projects she manages. She does do some journalng at work when feeling stressed.    Physical Activity: Currently has a walking regiment, she was going out 4-5 days per week during the warmer months. Now that the weather is getting colder and the days are shorter she doesn't feel safe walking  outside in the dark. She is considering doing a dancing video at home, trying to figure out where to put that in her schedule.     Family History (medical, traditions, culture, rules/routines around food):   Obesity/Overweight: none  Mental Health Diagnosis: mom  Substance Use Disorder/Alcoholism: none  Tobacco use: mom, grandmother  Family Cultural/Traditions: Growing up she was expected to eat what was provided but not forced to eat the whole thing. She was able to access foods and snacks without permission. No food insecurity.    Mental Health, Trauma and Substance use Assessment    Psychiatric/Psychological Treatment Diagnosis, History of Eating Disorders: Patient has a diagnosis of Generalized Anxiety Disorder, no eating disorders. She feels her symptoms are well managed now that she's no longer in the relationship with her boyfriend.     Outpatient Counselor Not currently     Psychiatrist No, did get an eval and was prescribed Zoloft but not taking    Have you had any Mental Health or Substance Use Inpatient Treatment? No    Drug and/or Alcohol use and treatment history: Patient denies any substance use disorder, she is a social drinker, she will have 2-3 drinks at a time 3-4 times in a month.     Tobacco/Vaping History: she is a non-smoker     Domestic Violence: No      Abuse or Trauma History: none      Risk Assessment    Stressors and Supports: Patient struggles with weight and the stress it puts on their health, some financial stress currently with her boyfriend moving out of their home but she is working on a plan. Her mother, sister, aunt, friends and a coworker know she's seeking surgery and are supportive.    Risk of harm to self or others: Patient denies any SI/HI.    Presence of Audio/Visual Hallucinations:  No audio or visual hallucinations reported.    Access to weapons: none     Observation: this interview    Based on the previous information, the client presents the following risk of harm to self  or others: low      Physical/Mental Health Status:     Appearance: appropriate  Sociability: friendly  Affect: appropriate  Mood: calm  Thought Process: coherent  Speech: normal  Content: no impairment  Orientation: person  Yes , place  Yes , time  Yes , normal attention span  Yes , normal memory  Yes  , decreased in concentration ability  No, and normal judgement  Yes   Insight: emotional  good       BARIATRIC SURGERY EDUCATION CHECKLIST    Patient has received the following education related to the bariatric surgery process and understands:    Patients may be required to complete a psychiatric evaluation and receive clearance for surgery from mental health provider.    Patients who undergo weight loss surgery are at higher risk of increased mental health concerns and suicide attempts.    Patients may be required to complete a full substance abuse evaluation and then complete all treatment recommendations prior to surgery.    If diagnosis of abuse/dependence results, patient may be required to remain sober for one (1) year before having bariatric surgery.    Patients on psychiatric medications should check with their provider to discuss psychiatric medications and the changes in absorption.  Patient should discuss all time release medications with provider and take all medications as prescribed.    The recommendation is that there is no use of any tobacco products, Hookah or vapes for the bariatric post-operation patient.    Bariatric surgery patients should not consume alcohol as a post-operative patient as it may increase risk of numerous health conditions including but not limited to alcohol abuse and ulcers.    There is a possibility of weight regain if patient does not follow all program guidelines and recommendations.    Bariatric surgery patients should exercise thirty (30) to sixty (60) minutes per day to maintain post-surgical weight loss.    Research indicates that bariatric patients are more successful  when they see a therapist for up to two (2) years post-op.    Patients will follow all medical and dietary recommendations provided.    Patient will keep all scheduled appointments and follow up with their physician for a minimum of five (5) years.    Patient will take all vitamins as recommended.  Post-operative vitamins are life-long.    There is a goal month set.  All requirements should be met by this time. Don't wait to get started!    There is a deadline month set.  All requirements must be finished by this time and if not, the patient will be halted in the surgery process. The patient can be referred to the medical weight management program or can come back to the surgical program once the unfinished tasks from the previous program are completed.      Female patients of childbearing years are informed that pregnancy is not recommended until 12 months post-op.      Recommendations: Recommended for surgery  yes and Patient meets the criteria to be a member of [facility] Bariatric surgery program.      Note:  Patient has a diagnosis of Generalized Anxiety Disorder that she feels is well managed. Since leaving a stressful relationship her symptoms have greatly reduced and she is doing well. Patient denies any substance use disorder, she drinks alcohol socially and is a non-smoker. Risk of alcohol, tobacco use, smoking or vaping of any kind post op were discussed, recommendation to abstain from all substance use was explained.  Impact of hormones on female reproduction post-op were discussed.  Patient is not currently pregnant and doesn't plan to become pregnant within one year of surgery. Patient occasionally skips breakfast, brings lunch with her, denies snacking but may do some mindless or emotional eating at times. Suggested she take note of her relationship with food, how she manages stress, and non-food coping skills she could be using. She had good questions about body image after surgery and  the impact surgery can have on relationship dynamics. No contraindications for surgery are identified at this time, patient is aware of post-op risks, it is recommended for patient to progress through surgical process.  Lamar Lauren LCSW, Bariatric

## 2024-11-13 NOTE — PROGRESS NOTES
Bariatric Nutrition Assessment Note    Type of surgery    Preop (4 wt checks)  Surgery Date: TBD--leaning toward sleeve  Surgeon: Edwin Surgeons: Dr. Newman (consult on 10/8/24)    Nutrition Assessment   Diane Gomez  24 y.o.  female     Wt with BMI of 25: 172.7#  Pre-Op Excess Wt: 78.5  Start weight on 10/8/24:  251.2  Current weight:  253.4#      [place]- [place] Equation:    [id number]  Weight Maintenance 2352  Estimated calories for weight loss 3084-3507 ( 1-2# per wk wt loss - sedentary )  Estimated protein needs 78.5-118g (1.0-1.5 gms/kg IBW )   Estimated fluid needs 2355-2747ml (30-35 ml/kg IBW )      Weight History   Onset of Obesity: Childhood  Family history of obesity: No  Wt Loss Attempts: Exercise  Self Created Diets (Portion Control, Healthy Food Choices, etc.)  Patient has tried the above for 6 months or more with insufficient weight loss or weight regain, which is why patient has requested to be evaluated for weight loss surgery today  Maximum Wt Lost: was 279# in May of this year.  Down about 20#      Review of History and Medications   Past Medical History:   Diagnosis Date    High cholesterol     Medical history reviewed with no changes      Past Surgical History:   Procedure Laterality Date    COLONOSCOPY W/ ENDOSCOPIC US  2019    WISDOM TOOTH EXTRACTION  06/2021     Social History     Socioeconomic History    Marital status: Single     Spouse name: Not on file    Number of children: Not on file    Years of education: Not on file    Highest education level: Not on file   Occupational History    Not on file   Tobacco Use    Smoking status: Never    Smokeless tobacco: Never   Vaping Use    Vaping status: Never Used   Substance and Sexual Activity    Alcohol use: Yes     Alcohol/week: 1.0 standard drink of alcohol     Types: 1 Glasses of wine per week     Comment: Casual alcohol assumption    Drug use: Never    Sexual activity: Yes     Partners: Male     Birth control/protection: None, Condom  Male   Other Topics Concern    Not on file   Social History Narrative    Not on file     Social Drivers of Health     Financial Resource Strain: Not on file   Food Insecurity: Not on file   Transportation Needs: Not on file   Physical Activity: Not on file   Stress: Not on file   Social Connections: Not on file   Intimate Partner Violence: Not on file   Housing Stability: Not on file       Current Outpatient Medications:     montelukast (SINGULAIR) 10 mg tablet, Take 1 tablet (10 mg total) by mouth daily after breakfast, Disp: 90 tablet, Rfl: 2    Food Intake and Lifestyle Assessment   Food Intake Assessment completed via usual diet recall  Community --works 8:15 to 3:45, but might have after school programs, etc.    at [employer] on Fridays    Wake:  5:45-6:30am  Breakfast: most often skips, but when did eat will have oatmeal  Snack: -   Lunch: usually leftovers OR salad (feta, cucumber, tomato, red onion, hummus, evoo + red wine vinegar OR bratta, spring mix, veggies, etc. OR chinese 2x/month  Snack: -  Dinner: used to cook when was in a relationship, but since no longer cooking less--may go to [employer] OR will go to mom's house. 3-6oz Meat, 1 cup starch + veggie  Snack: -  *not a big snacker, but if it is there ie: social event, will definitely indulge  Weekends: more skipping of meals and eating out    Beverage intake: water, juice (OJ), and regular soda  Protein supplement: none at this time  Estimated protein intake per day: <75gm  Estimated fluid intake per day: 40-80oz water, 8oz OJ every few days  Meals eaten away from home: 3-5 days per week for dinner  Typical meal pattern: 2-3 meals per day and 0-2 snacks per day  Eating Behaviors: Consumption of high calorie/ high fat foods, Large portion sizes, and Mindless eating  Food allergies or intolerances: No Known Allergies  Cultural or Jainism considerations: -    Physical Assessment  Physical Activity  Types of  "exercise: Walking when lighter out, but wants to find something else to do in the winter.   Current physical limitations: -    Psychosocial Assessment   Support systems: friend(s) relative(s)  Socioeconomic factors: lives alone so does all her own food shopping and cooking unless goes to mom's     Nutrition Diagnosis  Diagnosis: Overweight / Obesity (NC-3.3)  Related to: Physical inactivity and Excessive energy intake  As Evidenced by: BMI >25     Nutrition Prescription: Recommend the following diet  Regular    Interventions and Teaching   Discussed pre-op and post-op nutrition guidelines.       Patient educated and handouts provided.  Surgical changes to stomach / GI  Capacity of post-surgery stomach  Diet progression  Adequate hydration  Sugar and fat restriction to decrease \"dumping syndrome\"  Fat restriction to decrease steatorrhea  Expected weight loss  Weight loss plateaus/ possibility of weight regain  Exercise  Suggestions for pre-op diet  Nutrition considerations after surgery  Protein supplements  Meal planning and preparation  Appropriate carbohydrate, protein, and fat intake, and food/fluid choices to maximize safe weight loss, nutrient intake, and tolerance   Dietary and lifestyle changes  Possible problems with poor eating habits  Intuitive eating  Techniques for self monitoring and keeping daily food journal  Potential for food intolerance after surgery, and ways to deal with them including: lactose intolerance, nausea, reflux, vomiting, diarrhea, food intolerance, appetite changes, gas  Vitamin / Mineral supplementation of Multivitamin with minerals and Vitamin D  Post-operative pregnancy guidelines    Patient is not currently pregnant and doesn't desire to become pregnant a minimum of one year post-op    Education provided to: patient    Barriers to learning: No barriers identified    Readiness to change: preparation    Prior research on procedure: books, internet, discussed with provider, and " friends or family    Comprehension: verbalizes understanding     Expected Compliance: good    Recommendations  Pt is an appropriate candidate for surgery. Yes    Evaluation / Monitoring  Dietitian to Monitor: Eating pattern as discussed Tolerance of nutrition prescription Body weight Lab values Physical activity    Pre-op weight goals:  Do NOT Gain  Can go to BMI of 35:   240#  Encouraged weight loss w/ diet and lifestyle changes  Will be started on a 2 week liver shrinking diet, possibly shorter/longer as per the discretion of the surgeon/team, directly prior to surgery    Goals  Eliminate sugar sweetened beverages  Increase to 64oz fluids per day  Food journal via [employer]  Exercise 30 minutes 5 times per week--look into [employer] dance videos  Complete lesson plans 1-6  Eat 3 meals per day with protein at each meal  Use a protein shake as a meal replacement  Eliminate mindless snacking  Entered orders for pre-op labs today, to be completed at earliest convenience    Time Spent:   1 Hour

## 2024-12-12 ENCOUNTER — CLINICAL SUPPORT (OUTPATIENT)
Dept: BARIATRICS | Facility: CLINIC | Age: 24
End: 2024-12-12

## 2024-12-12 VITALS — WEIGHT: 258.8 LBS | BODY MASS INDEX: 38.33 KG/M2 | HEIGHT: 69 IN

## 2024-12-12 VITALS — BODY MASS INDEX: 38.22 KG/M2 | WEIGHT: 258.8 LBS

## 2024-12-12 DIAGNOSIS — E66.812 OBESITY, CLASS II, BMI 35-39.9: Primary | ICD-10-CM

## 2024-12-12 PROCEDURE — RECHECK

## 2024-12-12 NOTE — PROGRESS NOTES
Bariatric Nutrition F/U  Note    Type of surgery    Preop (4 wt checks)--3 of 4 wt check  Surgery Date: TBD--leaning toward sleeve  Surgeon: Edwin Surgeons: Dr. Newman (consult on 10/8/24)    Nutrition Assessment   Diane Gomez  24 y.o.  female     Wt with BMI of 25: 172.7#  Pre-Op Excess Wt: 78.5  Start weight on 10/8/24:  251.2  Current weight:  258.8#  Body mass index is 38.22 kg/m².  Net weight change:  +7.7#    [place]- [place] Reg[place] Equation:    [id number]  Weight Maintenance 2352  Estimated calories for weight loss 2597-1414 ( 1-2# per wk wt loss - sedentary )  Estimated protein needs 78.5-118g (1.0-1.5 gms/kg IBW )   Estimated fluid needs 2355-2747ml (30-35 ml/kg IBW )      Weight History   Onset of Obesity: Childhood  Family history of obesity: No  Wt Loss Attempts: Exercise  Self Created Diets (Portion Control, Healthy Food Choices, etc.)  Patient has tried the above for 6 months or more with insufficient weight loss or weight regain, which is why patient has requested to be evaluated for weight loss surgery today  Maximum Wt Lost: was 279# in May of this year.  Down about 20#    Review of History and Medications   Past Medical History:   Diagnosis Date    High cholesterol     Medical history reviewed with no changes      Past Surgical History:   Procedure Laterality Date    COLONOSCOPY W/ ENDOSCOPIC US  2019    WISDOM TOOTH EXTRACTION  06/2021     Social History     Socioeconomic History    Marital status: Single     Spouse name: Not on file    Number of children: Not on file    Years of education: Not on file    Highest education level: Not on file   Occupational History    Not on file   Tobacco Use    Smoking status: Never    Smokeless tobacco: Never   Vaping Use    Vaping status: Never Used   Substance and Sexual Activity    Alcohol use: Yes     Alcohol/week: 1.0 standard drink of alcohol     Types: 1 Glasses of wine per week     Comment: Casual alcohol assumption    Drug use: Never    Sexual activity:  Yes     Partners: Male     Birth control/protection: None, Condom Male   Other Topics Concern    Not on file   Social History Narrative    Not on file     Social Drivers of Health     Financial Resource Strain: Not on file   Food Insecurity: Not on file   Transportation Needs: Not on file   Physical Activity: Not on file   Stress: Not on file   Social Connections: Not on file   Intimate Partner Violence: Not on file   Housing Stability: Not on file       Current Outpatient Medications:     montelukast (SINGULAIR) 10 mg tablet, Take 1 tablet (10 mg total) by mouth daily after breakfast, Disp: 90 tablet, Rfl: 2    Food Intake and Lifestyle Assessment   Food Intake Assessment completed via usual diet recall  Community --works 8:15 to 3:45, but might have after school programs, etc.    at [employer] on Fridays    Wake:  5:45-6:30am  Breakfast: most often skips, but when did eat the mini bites. Talked about protein breakfast options to try  Snack: -   Lunch: usually leftovers OR salad (feta, cucumber, tomato, red onion, hummus, evoo + red wine vinegar OR bratta, spring mix, veggies, etc. OR chinese 2x/month  Snack: -  Dinner: last night was frozen chicken nuggets (8) with green beans + mashed potatoes OR pasta with chickpeas, olives, etc. OR sushi/sashimi on the weekend or when out had sushi with fried rice.  Snack: hot chocolate with whole or 2% milk or almond milk--suggested sf hot chocolate + almond milk  *not a big snacker, but if it is there a social event, will definitely indulge  Some snacking on dried oziel and pecans, but again not a big snacker  Weekends: more skipping of meals and eating out    Beverage intake: water, juice (OJ), and regular soda  Protein supplement: none at this time  Estimated protein intake per day: <75gm  Estimated fluid intake per day: 40-80oz water, 8oz OJ every few days  Meals eaten away from home: 3-5 days per week for dinner  Typical meal pattern:  2-3 meals per day and 0-2 snacks per day  Eating Behaviors: Consumption of high calorie/ high fat foods, Large portion sizes, and Mindless eating  Food allergies or intolerances: No Known Allergies  Cultural or Lutheran considerations: -    Physical Assessment  Physical Activity  Types of exercise: no set regimen at this time.   Current physical limitations: -    Psychosocial Assessment   Support systems: friend(s) relative(s)  Socioeconomic factors: lives alone so does all her own food shopping and cooking unless goes to mom's     Nutrition Diagnosis  Diagnosis: Overweight / Obesity (NC-3.3)  Related to: Physical inactivity and Excessive energy intake  As Evidenced by: BMI >25     Nutrition Prescription: Recommend the following diet  Regular    Interventions and Teaching   Discussed pre-op and post-op nutrition guidelines.       Patient educated and handouts provided.  Surgical changes to stomach / GI  Capacity of post-surgery stomach  Diet progression  Adequate hydration  Expected weight loss  Weight loss plateaus/ possibility of weight regain  Exercise  Suggestions for pre-op diet  Nutrition considerations after surgery  Protein supplements  Meal planning and preparation  Appropriate carbohydrate, protein, and fat intake, and food/fluid choices to maximize safe weight loss, nutrient intake, and tolerance   Dietary and lifestyle changes  Possible problems with poor eating habits  Intuitive eating  Techniques for self monitoring and keeping daily food journal  Potential for food intolerance after surgery, and ways to deal with them including: lactose intolerance, nausea, reflux, vomiting, diarrhea, food intolerance, appetite changes, gas  Vitamin / Mineral supplementation of Multivitamin with minerals and Vitamin D  Post-operative pregnancy guidelines    Patient is not currently pregnant and doesn't desire to become pregnant a minimum of one year post-op    Education provided to: patient    Barriers to learning:  No barriers identified    Readiness to change: preparation    Prior research on procedure: books, internet, discussed with provider, and friends or family    Comprehension: verbalizes understanding     Expected Compliance: good    Recommendations  Pt is an appropriate candidate for surgery. Yes    Evaluation / Monitoring  Dietitian to Monitor: Eating pattern as discussed Tolerance of nutrition prescription Body weight Lab values Physical activity    Pre-op weight goals:  Do NOT Gain  Can go to BMI of 35:   240#  Encouraged weight loss w/ diet and lifestyle changes  Will be started on a 2 week liver shrinking diet, possibly shorter/longer as per the discretion of the surgeon/team, directly prior to surgery    Workflow: (Incomplete in Bold):  Psych and/or D+A Clearance: n/a  Blood Work: orders in EPIC  PCP letter: completed  Surgeon Appt:  completed  EGD:  Likely Jan 17  Cardiac Risk Assessment with ECG: will call to schedule  Sleep Studies: Neg STOP BANG 2 of 8  Nicotine test: n/a  Pre-Operative Program: 3 of 4 wt check     Goals  Eliminate sugar sweetened beverages--avoid OJ  Increase to 64oz fluids per day daily  Food journal via [employer]  Exercise 30 minutes 5 times per week--look into [employer] dance videos  Complete lesson plans 1-6  Eat 3 meals per day with protein at each meal  Use a protein shake as a meal replacement   Add protein to breakfast  Try the sample menu provided for breakfast, lunch and dinner  Eliminate mindless snacking    Time Spent:   30 mins

## 2024-12-12 NOTE — PROGRESS NOTES
Patient presents 3 of 4 weight check, current weight 258.8lbs.    Eating behaviors/food choices: Reports some emotional eating and skipping meals due to very busy schedule. She has had some shifts in her work, losing a part time job and projects at full time job that have been on her mind. She has skipped breakfast during the week, may miss lunch on the weekends. Encouraged to pay attention to stress, to structure days to get in three meals, reviewed the importance of consistent nutrition pre and post-op.    Activity/Exercise:  not discussed during visit    Sleep/Rest:  not discussed during visit    Mental Health/Wellness:  Patient report increased stress, she lost her part time job which was upsetting to her, likely impacting her mental wellness more than she initially realized. This time of year is busy with her projects at work, discussed importance of time management and finding time to carve out for her own self care. Patient's coping skill to replace snacking are watching TV and reading but sometimes she's scrolling on her phone which may take out time she could use in other efforts. Discussed ways for her to balance time by using [employer] on her phone to manage use, discussed things she could focus on such as prepping meals, planning ahead for her weeks and maximizing time when she does have it. Encouraged her to pay attention to stressors that she may not be mindful of and their impact on eating decisions.    Workflow review:    Labs and PCP: needs labs, PCP letter done  Psych and EGD: no eval needed, EGD to be scheduled  Nicotine: NA  Cardiology:  to be scheduled  Sleep: NA  Weight Checks: 4    Goals:    - avoid skipping meals, prep for breakfast during the week  - evaluate stressors that may contribute to emotional eating  - evaluate time management, carve out time for self care    Next Appointment:  with AICHA on 1/16

## 2024-12-30 ENCOUNTER — TELEPHONE (OUTPATIENT)
Dept: OBGYN CLINIC | Facility: CLINIC | Age: 24
End: 2024-12-30

## 2025-01-13 ENCOUNTER — TELEPHONE (OUTPATIENT)
Age: 25
End: 2025-01-13

## 2025-01-13 NOTE — TELEPHONE ENCOUNTER
Patient called in to cancel upcoming appts and EGD, she has decided to hold off on surgical process for right now, will call back if she changes her mind and would like to proceed.    Patient's wife, dagoberto calling to speak to nurse with some questions. Please call patient's wife at [phone].

## 2025-01-17 ENCOUNTER — TELEPHONE (OUTPATIENT)
Dept: BARIATRICS | Facility: CLINIC | Age: 25
End: 2025-01-17

## 2025-01-17 NOTE — TELEPHONE ENCOUNTER
Spoke to patient regarding cancelled appointment on 1/16 and cancelled EGD. She reports she is not ready to have surgery, wants to halt process. Tam explained and NELDA offered if interested.

## 2025-02-25 ENCOUNTER — RA CDI HCC (OUTPATIENT)
Dept: OTHER | Facility: HOSPITAL | Age: 25
End: 2025-02-25

## 2025-02-25 PROBLEM — E66.01 MORBID (SEVERE) OBESITY DUE TO EXCESS CALORIES (HCC): Status: RESOLVED | Noted: 2022-10-19 | Resolved: 2025-02-25

## 2025-02-25 NOTE — PROGRESS NOTES
HCC coding opportunities       Chart reviewed, no opportunity found: CHART REVIEWED, NO OPPORTUNITY FOUND      This is a reminder to address (resolve/update/assess) ALL HCC (risk adjustment) codes as found on active problem list for 2025 as patient scores reset to zero NARGIS.  Patients Insurance        Commercial Insurance: [employer]

## 2025-03-05 ENCOUNTER — OFFICE VISIT (OUTPATIENT)
Dept: INTERNAL MEDICINE CLINIC | Facility: CLINIC | Age: 25
End: 2025-03-05
Payer: COMMERCIAL

## 2025-03-05 VITALS
DIASTOLIC BLOOD PRESSURE: 76 MMHG | HEART RATE: 62 BPM | OXYGEN SATURATION: 99 % | WEIGHT: 258 LBS | BODY MASS INDEX: 38.21 KG/M2 | HEIGHT: 69 IN | SYSTOLIC BLOOD PRESSURE: 118 MMHG

## 2025-03-05 DIAGNOSIS — E66.01 MORBID (SEVERE) OBESITY DUE TO EXCESS CALORIES (HCC): Primary | ICD-10-CM

## 2025-03-05 DIAGNOSIS — K21.9 GASTROESOPHAGEAL REFLUX DISEASE WITHOUT ESOPHAGITIS: ICD-10-CM

## 2025-03-05 PROCEDURE — 99213 OFFICE O/P EST LOW 20 MIN: CPT | Performed by: INTERNAL MEDICINE

## 2025-03-05 NOTE — ASSESSMENT & PLAN NOTE
Symptoms controlled agree continue manage medication follow    Pepcid 20 mg daily    Antireflux measure discussed  Instructed about the diet continue Pepcid as needed

## 2025-03-05 NOTE — PROGRESS NOTES
Name: Diane Gomez      : 2000      MRN: [id number]  Encounter Provider: Judah Henao MD  Encounter Date: 3/5/2025   Encounter department: [facility] INTERNAL MEDICINE  :  Assessment & Plan  Gastroesophageal reflux disease without esophagitis  Symptoms controlled agree continue manage medication follow    Pepcid 20 mg daily    Antireflux measure discussed  Instructed about the diet continue Pepcid as needed       Morbid (severe) obesity due to excess calories (HCC)  Patient's BMI 38.10 associated with hyperlipidemia prediabetes advised cut down the calorie cut down the portion change in lifestyle diet exercise to lose weight counseling done as follows    Patient was seen at the weight loss center recommended gastric bypass patient refused    Also discussed at length GLP-1 agonist due to the cost patient has postponed the idea    Counseling done as follows    BMI Counseling:      The BMI is above normal. Know Body weight goal    Weigh yourself daily or weekly as per your doctor    Nutrition recommendations include decreasing portion sizes, encouraging healthy choices of fruits and vegetables, decreasing     fast food intake, consuming healthier snacks, limiting drinks that contain sugar, moderation in carbohydrate intake, increasing     intake of lean protein, reducing intake of saturated and trans fat and reducing intake of cholesterol  Discussed options of HealthyCORE-Intensive Lifestyle Intervention Program, Very Low Calorie Diet-VLCD and Conservative Program and the role of weight loss medications.  - Explained the importance of making lifestyle changes in addition to starting anti-obesity medications.   -                History of Present Illness   Came in for follow-up for the management for morbid severe obesity denies any chest pain difficulty breathing no new symptoms seen by weight loss center was recommended gastric bypass surgery refused discussed GLP-1 agonist patient  "cannot afford at present time      Review of Systems   Constitutional:  Negative for activity change, appetite change, chills, diaphoresis, fatigue, fever and unexpected weight change.   HENT:  Negative for congestion, dental problem, drooling, ear discharge, ear pain, facial swelling, hearing loss, mouth sores, nosebleeds, postnasal drip, rhinorrhea, sinus pressure, sneezing, sore throat, tinnitus, trouble swallowing and voice change.    Eyes:  Negative for photophobia, pain, discharge, redness, itching and visual disturbance.   Respiratory:  Negative for apnea, cough, choking, chest tightness, shortness of breath, wheezing and stridor.    Cardiovascular:  Negative for chest pain, palpitations and leg swelling.   Gastrointestinal:  Negative for abdominal distention, abdominal pain, anal bleeding, blood in stool, constipation, diarrhea, nausea, rectal pain and vomiting.   Endocrine: Negative for cold intolerance, heat intolerance, polydipsia, polyphagia and polyuria.   Genitourinary:  Negative for decreased urine volume, difficulty urinating, dysuria, enuresis, flank pain, frequency, genital sores, hematuria and urgency.   Musculoskeletal:  Negative for arthralgias, back pain, gait problem, joint swelling, myalgias, neck pain and neck stiffness.   Skin:  Negative for color change, pallor, rash and wound.   Allergic/Immunologic: Negative.  Negative for environmental allergies, food allergies and immunocompromised state.   Neurological:  Negative for dizziness, tremors, seizures, syncope, facial asymmetry, speech difficulty, weakness, light-headedness, numbness and headaches.   Psychiatric/Behavioral:  Negative for agitation, behavioral problems, confusion, decreased concentration, dysphoric mood, hallucinations, self-injury, sleep disturbance and suicidal ideas. The patient is not nervous/anxious and is not hyperactive.        Objective   /76   Pulse 62   Ht 5' 9\" (1.753 m)   Wt 117 kg (258 lb)   SpO2 99%  "  BMI 38.10 kg/m²      Physical Exam  Vitals and nursing note reviewed.   Constitutional:       General: She is not in acute distress.     Appearance: She is well-developed. She is obese. She is not ill-appearing, toxic-appearing or diaphoretic.   HENT:      Head: Normocephalic and atraumatic.      Right Ear: External ear normal.      Left Ear: External ear normal.      Nose: Nose normal.      Mouth/Throat:      Pharynx: No oropharyngeal exudate.   Eyes:      General: Lids are normal. Lids are everted, no foreign bodies appreciated. No scleral icterus.        Right eye: No discharge.         Left eye: No discharge.      Conjunctiva/sclera: Conjunctivae normal.      Pupils: Pupils are equal, round, and reactive to light.   Neck:      Thyroid: No thyromegaly.      Vascular: Normal carotid pulses. No carotid bruit, hepatojugular reflux or JVD.      Trachea: No tracheal tenderness or tracheal deviation.   Cardiovascular:      Rate and Rhythm: Normal rate and regular rhythm.      Pulses: Normal pulses.      Heart sounds: Normal heart sounds. No murmur heard.     No friction rub. No gallop.   Pulmonary:      Effort: Pulmonary effort is normal. No respiratory distress.      Breath sounds: Normal breath sounds. No stridor. No wheezing or rales.   Chest:      Chest wall: No tenderness.   Abdominal:      General: Bowel sounds are normal. There is no distension.      Palpations: Abdomen is soft. There is no mass.      Tenderness: There is no abdominal tenderness. There is no guarding or rebound.   Musculoskeletal:         General: No tenderness or deformity. Normal range of motion.      Cervical back: Normal range of motion and neck supple. No edema, erythema or rigidity. No spinous process tenderness or muscular tenderness. Normal range of motion.   Lymphadenopathy:      Head:      Right side of head: No submental, submandibular, tonsillar, preauricular or posterior auricular adenopathy.      Left side of head: No submental,  submandibular, tonsillar, preauricular, posterior auricular or occipital adenopathy.      Cervical: No cervical adenopathy.      Right cervical: No superficial, deep or posterior cervical adenopathy.     Left cervical: No superficial, deep or posterior cervical adenopathy.      Upper Body:      Right upper body: No pectoral adenopathy.      Left upper body: No pectoral adenopathy.   Skin:     General: Skin is warm and dry.      Coloration: Skin is not pale.      Findings: No erythema or rash.   Neurological:      General: No focal deficit present.      Mental Status: She is alert and oriented to person, place, and time.      Cranial Nerves: No cranial nerve deficit.      Sensory: No sensory deficit.      Motor: No tremor, abnormal muscle tone or seizure activity.      Coordination: Coordination normal.      Gait: Gait normal.      Deep Tendon Reflexes: Reflexes are normal and symmetric. Reflexes normal.   Psychiatric:         Behavior: Behavior normal.         Thought Content: Thought content normal.         Judgment: Judgment normal.

## 2025-03-05 NOTE — ASSESSMENT & PLAN NOTE
Patient's BMI 38.10 associated with hyperlipidemia prediabetes advised cut down the calorie cut down the portion change in lifestyle diet exercise to lose weight counseling done as follows    Patient was seen at the weight loss center recommended gastric bypass patient refused    Also discussed at length GLP-1 agonist due to the cost patient has postponed the idea    Counseling done as follows    BMI Counseling:      The BMI is above normal. Know Body weight goal    Weigh yourself daily or weekly as per your doctor    Nutrition recommendations include decreasing portion sizes, encouraging healthy choices of fruits and vegetables, decreasing     fast food intake, consuming healthier snacks, limiting drinks that contain sugar, moderation in carbohydrate intake, increasing     intake of lean protein, reducing intake of saturated and trans fat and reducing intake of cholesterol  Discussed options of HealthyCORE-Intensive Lifestyle Intervention Program, Very Low Calorie Diet-VLCD and Conservative Program and the role of weight loss medications.  - Explained the importance of making lifestyle changes in addition to starting anti-obesity medications.   -

## 2025-03-12 ENCOUNTER — OFFICE VISIT (OUTPATIENT)
Age: 25
End: 2025-03-12
Payer: COMMERCIAL

## 2025-03-12 VITALS — TEMPERATURE: 98.4 F | WEIGHT: 255.8 LBS | BODY MASS INDEX: 37.78 KG/M2

## 2025-03-12 DIAGNOSIS — L73.2 HIDRADENITIS SUPPURATIVA: ICD-10-CM

## 2025-03-12 DIAGNOSIS — B36.0 TINEA VERSICOLOR: Primary | ICD-10-CM

## 2025-03-12 PROCEDURE — 99204 OFFICE O/P NEW MOD 45 MIN: CPT | Performed by: DERMATOLOGY

## 2025-03-12 RX ORDER — FLUCONAZOLE 150 MG/1
150 TABLET ORAL DAILY
Qty: 14 TABLET | Refills: 0 | Status: SHIPPED | OUTPATIENT
Start: 2025-03-12 | End: 2025-03-26

## 2025-03-12 RX ORDER — DOXYCYCLINE 100 MG/1
CAPSULE ORAL
Qty: 30 CAPSULE | Refills: 3 | Status: SHIPPED | OUTPATIENT
Start: 2025-03-12 | End: 2025-06-12

## 2025-03-12 NOTE — PATIENT INSTRUCTIONS
TINEA VERSICOLOR    Assessment and Plan:  Based on a thorough discussion of this condition and the management approach to it (including a comprehensive discussion of the known risks, side effects and potential benefits of treatment), the patient (family) agrees to implement the following specific plan:  Start diflucan 150 mg- Take 1 tablet daily for 14 days.     What is tinea versicolor?  Tinea versicolor or pityriasis versicolor is a type of fungal infection on the skin due to a yeast that lives on all of us. It Is due an overgrowth of a type of yeast called Malassezia furfur, which feeds on oils in the skin and thrives in warm, humid environments. Anyone can develop tinea versicolor, but it is more common during the summer months and in tropical climates. Those who tend to sweat more heavily are also at higher risk. Although it is not considered infectious, multiple family members can be affected.   Teens and young adults are most susceptible due to having oily skin   Affects people of all skin colors   Weakened immune system predisposes to development     What are the clinical symptoms of tinea versicolor?   The first sign of tinea versicolor is often spots on the skin. They can be lighter or darker than surrounding skin, with colors ranging from white, pink, tan, to brown.   The spots are dry, scaly, and sometimes itchy   Can appear anywhere on the body, but more commonly over the neck, trunk, and arms   Spots can grow together forming larger patches   May disappear when temperature drops and return once it becomes warm again  Pale spots can be confused with vitiligo    How do we diagnose tinea versicolor?  Tinea versicolor is usually diagnosed with a history and physical examination. However, the following tests may be useful for confirmation when in doubt.  Wood lamp (black light) examination-- yellow-green glow may be observed in affected areas  Microscopy using potassium hydroxide (KOH) to examine skin  scrapings  Fungal culture--this is usually reported to be negative, as it is quite difficult to persuade the yeasts to grow in a laboratory  Skin biopsy--fungal elements may be seen within the outer cells of the skin (stratum corneum) on histopathology    How do we treat tinea versicolor?   There are many different options to treat tinea versicolor. The treatment chosen may depend on how thick the spots have grown and how much of the body has been affected. Mild tinea versicolor can be treated with primarily topical antifungal agents. These include:  Ketoconazole cream/shampoo  Selenium sulfide   Terbinafine gel   Ciclopirox cream/solution   Propylene glycol solution   Sodium thiosulphate solution   Topical medications should be applied widely to affected areas before bedtime for between three days to two weeks depending on your dermatologists recommendation.   Use of medicated cleansers once or twice a month may prevent recurrence in those who have has multiple bouts of yeast overgrowth     For extensive skin involvement or after failure of topical medications, oral antifungal agents such as itraconazole and fluconazole can be used. Oral terbinafine used to treat dermatophyte infections is not effective against tinea versicolor.   Vigorous exercise an hour after taking the medication may help sweat it onto the skin surface and enhance clearance of the yeast      HIDRADENITIS SUPPURATIVA      Assessment and Plan:  Based on a thorough discussion of this condition and the management approach to it (including a comprehensive discussion of the known risks, side effects and potential benefits of treatment), the patient (family) agrees to implement the following specific plan:  Benzoyl peroxide wash ( Panoxyl)- Over the counter   Doxycycline 100 mg - Take 1 tablet 2 times a day when flares occur.      What is hidradenitis suppurativa?  Hidradenitis suppurativa is an inflammatory skin disease that affects apocrine  gland-bearing skin in the axillae, in the groin, and under the breasts. It is characterised by recurrent boil-like nodules and abscesses that culminate in pus-like discharge, difficult-to-heal open wounds (sinuses) and scarring. Hidradenitis suppurativa also has significant psychological impact and many patients suffer from impairment of body image, depression and anxiety.     The term hidradenitis implies it starts as an inflammatory disorder of sweat glands, which is now known to be incorrect. Hidradenitis suppurativa is also known as acne inversa.    Who gets hidradenitis suppurativa?  Hidradenitis often starts at puberty, and is most active between the ages of 20 and 40 years, and in women, can resolve at menopause. It is three times more common in females than in males. Risk factors include:  Other family members with hidradenitis suppurativa  Obesity and insulin resistance/metabolic syndrome  Cigarette smoking  Follicular occlusion disorders: acne conglobata, dissecting cellulitis, pilonidal sinus  Inflammatory bowel disease (Crohn disease)  Rare autoinflammatory syndromes associated with abnormalities of PSTPIP1 gene.*    * PAPA syndrome (pyogenic arthritis, pyoderma gangrenosum and acne), PASH syndrome (pyoderma gangrenosum, acne, suppurative hidradenitis) and PAPASH syndrome (pyogenic arthritis, pyoderma gangrenosum, acne, suppurative hidradenitis).    What causes hidradenitis suppurativa?  Hidradenitis suppurativa is an autoinflammatory disorder. Although the exact cause is not yet understood, contributing factors include:  Friction from clothing and body folds  Aberrant immune response to commensal bacteria  Abnormal cutaneous or follicular microbiome  Follicular occlusion  Release of pro-inflammatory cytokines  Inflammation causing rupture of the follicular wall and destroying apocrine glands and ducts  Secondary bacterial infection  Certain drugs.    What are the clinical features of hidradenitis  suppurativa?  Hidradenitis can affect a single or multiple areas in the armpits, neck, sub mammary area, and inner thighs. Anogenital involvement most commonly affects the groin, mons pubis, vulva (in females), sides of the scrotum (in males), perineum, buttocks and perianal folds.  Signs include:  Open and closed comedones  Painful firm papules, larger nodules and pleated ridges  Pustules, fluctuant pseudocysts and abscesses  Pyogenic granulomas  Draining sinuses linking inflammatory lesions  Hypertrophic and atrophic scars.    Many patients with hidradenitis suppurativa also suffer from other skin disorders, including acne, hirsutism and psoriasis.    The severity and extent of hidradenitis suppurativa is recorded at assessment and when determining the impact of a treatment. The Rivera system describes three distinct clinical stages:  Solitary or multiple, isolated abscess formation without scarring or sinus tracts  Recurrent abscesses, single or multiple widely  lesions, with sinus tract formation  Diffuse or broad involvement, with multiple interconnected sinus tracts and abscesses.    Severe hidradenitis (Rivera Stage 3) has been associated with:  Male sex  Axillary and perianal involvement  Obesity  Smoking  Higher risk of stroke, coronary artery disease, heart failure, and peripheral artery disease  Disease duration.    What is the treatment for hidradenitis suppurativa?  General measures  Weight loss; follow an anti-inflammatory, low-sugar, low-grain, low-dairy diet (mainly plants)  Smoking cessation: this can lead to improvement within a few months  Loose fitting clothing  Daily unfragranced antiperspirants  If prone to secondary infection, wash with antiseptics or take bleach baths  Apply hydrogen peroxide solution or medical grade honey to reduce malodour  Use peeling agents such as resorcinol 15% cream to de-roof nodules  Apply simple dressings to draining sinuses  Analgesics, such as  paracetamol (acetaminophen), for pain control  Seek help to manage anxiety and depression.    Medical management of hidradenitis suppurativa  Medical management of hidradenitis suppurativa is difficult. Treatment is required long term. Effective options are listed below.    Antibiotics  Topical clindamycin, with benzoyl peroxide to reduce bacterial resistance  Short course of oral antibiotics for acute staphylococcal abscesses, eg flucloxacillin  Prolonged courses (minimum 3 months) of tetracycline, metronidazole, trimethoprim + sulphamethoxazole, fluoroquinolones, ertapenem or dapsone for their anti-inflammatory action  6-12 week courses of the combination of clindamycin (or doxycycline) and rifampicin for severe disease.    Antiandrogens  Long-term oral contraceptive pill; antiandrogenic progesterones drospirenone or cyproterone acetate may be more effective than standard combined pills. These are more suitable than progesterone-only pills or devices.  Spironolactone and finasteride  Response takes 6 months or longer.    Immunomodulatory treatments for severe disease  Intralesional corticosteroids into nodules  Systemic corticosteroids short-term for flares  Methotrexate, ciclosporin, and azathioprine  TNF-? inhibitors adalimumab and infliximab, used in higher dose than required for psoriasis, are the most successful treatments to date. Note that paradoxically, they may sometimes induce new-onset hidradenitis suppurativa  Other biologics are under investigation, such as the IL-1? antagonist, canakinumab    Other medical treatments  Metformin in patients with insulin resistance  Acitretin (unsuitable for females of childbearing potential)  Isotretinoin -- effective for acne but appears unhelpful for most cases of hidradenitis  Colchicine  Medical management of anxiety and depression    Surgical management of hidradenitis suppurativa  Incision and drainage of acute abscesses  Curettage and deroofing of nodules,  abscesses and sinuses  Laser ablation of nodules, abscesses and sinuses  Wide local excision of persistent nodules  Radical excisional surgery of entire affected area  Nd:YAG laser hair removal

## 2025-03-12 NOTE — PROGRESS NOTES
"[facility]ology [facility] Note     Patient Name: Diane Gomez  Encounter Date: 3/12/2025     Have you been cared for by a [facility] Dermatologist in the last 3 years and, if so, which description applies to you?    NO.   I am considered a \"new\" patient and must complete all patient intake questions. I am FEMALE/of child-bearing potential.    REVIEW OF SYSTEMS:  Have you recently had or currently have any of the following? Recent fever or chills? No  Any non-healing wound? No  Are you pregnant or planning to become pregnant? No  Are you currently or planning to be nursing or breast feeding? No   PAST MEDICAL HISTORY:  Have you personally ever had or currently have any of the following?  If \"YES,\" then please provide more detail. Skin cancer (such as Melanoma, Basal Cell Carcinoma, Squamous Cell Carcinoma?  No  Tuberculosis, HIV/AIDS, Hepatitis B or C: No  Radiation Treatment No   HISTORY OF IMMUNOSUPPRESSION:   Do you have a history of any of the following:  Systemic Immunosuppression such as Diabetes, Biologic or Immunotherapy, Chemotherapy, Organ Transplantation, Bone Marrow Transplantation or Prednsione?  No    Answering \"YES\" requires the addition of the dotphrase \"IMMUNOSUPPRESSED\" as the first diagnosis of the patient's visit.   FAMILY HISTORY:  Any \"first degree relatives\" (parent, brother, sister, or child) with the following?    Skin Cancer, Pancreatic or Other Cancer? No   PATIENT EXPERIENCE:    Do you want the Dermatologist to perform a COMPLETE skin exam today including a clinical examination under the \"bra and underwear\" areas?  Yes  If necessary, do we have your permission to call and leave a detailed message on your Preferred Phone number that includes your specific medical information?  Yes      No Known Allergies   Current Outpatient Medications:     montelukast (SINGULAIR) 10 mg tablet, Take 1 tablet (10 mg total) by mouth daily after breakfast (Patient not taking: Reported on 3/5/2025), " Disp: 90 tablet, Rfl: 2          Whom besides the patient is providing clinical information about today's encounter?   NO ADDITIONAL HISTORIAN (patient alone provided history)    Physical Exam and Assessment/Plan by Diagnosis:          TINEA VERSICOLOR    Physical Exam:  Anatomic Location Affected:  Trunk  Morphological Description:  widespread hypopigmented scaly patches on the torso  Pertinent Positives:  Pertinent Negatives:    Additional History of Present Condition:  Patient repots that she was diagnosed at a young age.     Assessment and Plan:  Based on a thorough discussion of this condition and the management approach to it (including a comprehensive discussion of the known risks, side effects and potential benefits of treatment), the patient (family) agrees to implement the following specific plan:  Start diflucan 150 mg- Take 1 tablet daily for 14 days.     What is tinea versicolor?  Tinea versicolor or pityriasis versicolor is a type of fungal infection on the skin due to a yeast that lives on all of us. It Is due an overgrowth of a type of yeast called Malassezia furfur, which feeds on oils in the skin and thrives in warm, humid environments. Anyone can develop tinea versicolor, but it is more common during the summer months and in tropical climates. Those who tend to sweat more heavily are also at higher risk. Although it is not considered infectious, multiple family members can be affected.   Teens and young adults are most susceptible due to having oily skin   Affects people of all skin colors   Weakened immune system predisposes to development     What are the clinical symptoms of tinea versicolor?   The first sign of tinea versicolor is often spots on the skin. They can be lighter or darker than surrounding skin, with colors ranging from white, pink, tan, to brown.   The spots are dry, scaly, and sometimes itchy   Can appear anywhere on the body, but more commonly over the neck, trunk, and arms    Spots can grow together forming larger patches   May disappear when temperature drops and return once it becomes warm again  Pale spots can be confused with vitiligo    How do we diagnose tinea versicolor?  Tinea versicolor is usually diagnosed with a history and physical examination. However, the following tests may be useful for confirmation when in doubt.  Wood lamp (black light) examination-- yellow-green glow may be observed in affected areas  Microscopy using potassium hydroxide (KOH) to examine skin scrapings  Fungal culture--this is usually reported to be negative, as it is quite difficult to persuade the yeasts to grow in a laboratory  Skin biopsy--fungal elements may be seen within the outer cells of the skin (stratum corneum) on histopathology    How do we treat tinea versicolor?   There are many different options to treat tinea versicolor. The treatment chosen may depend on how thick the spots have grown and how much of the body has been affected. Mild tinea versicolor can be treated with primarily topical antifungal agents. These include:  Ketoconazole cream/shampoo  Selenium sulfide   Terbinafine gel   Ciclopirox cream/solution   Propylene glycol solution   Sodium thiosulphate solution   Topical medications should be applied widely to affected areas before bedtime for between three days to two weeks depending on your dermatologists recommendation.   Use of medicated cleansers once or twice a month may prevent recurrence in those who have has multiple bouts of yeast overgrowth     For extensive skin involvement or after failure of topical medications, oral antifungal agents such as itraconazole and fluconazole can be used. Oral terbinafine used to treat dermatophyte infections is not effective against tinea versicolor.   Vigorous exercise an hour after taking the medication may help sweat it onto the skin surface and enhance clearance of the yeast      HIDRADENITIS SUPPURATIVA    Physical  Exam:  Anatomic Location Affected:  groin, bilateral axilla  Morphological Description:  PIH in axillae, PIH and comedonal scarring on upper, inner thighs  Pertinent Positives:Rivera Stage 1  Pertinent Negatives:    Additional History of Present Condition:  Patient reports that she sometims gets boils under her armpits, her groin area and buttocks.     Assessment and Plan:  Based on a thorough discussion of this condition and the management approach to it (including a comprehensive discussion of the known risks, side effects and potential benefits of treatment), the patient (family) agrees to implement the following specific plan:  Benzoyl peroxide wash ( Panoxyl)- Over the counter   Doxycycline 100 mg - Take 1 tablet 2 times a day when flares occur.      What is hidradenitis suppurativa?  Hidradenitis suppurativa is an inflammatory skin disease that affects apocrine gland-bearing skin in the axillae, in the groin, and under the breasts. It is characterised by recurrent boil-like nodules and abscesses that culminate in pus-like discharge, difficult-to-heal open wounds (sinuses) and scarring. Hidradenitis suppurativa also has significant psychological impact and many patients suffer from impairment of body image, depression and anxiety.     The term hidradenitis implies it starts as an inflammatory disorder of sweat glands, which is now known to be incorrect. Hidradenitis suppurativa is also known as acne inversa.    Who gets hidradenitis suppurativa?  Hidradenitis often starts at puberty, and is most active between the ages of 20 and 40 years, and in women, can resolve at menopause. It is three times more common in females than in males. Risk factors include:  Other family members with hidradenitis suppurativa  Obesity and insulin resistance/metabolic syndrome  Cigarette smoking  Follicular occlusion disorders: acne conglobata, dissecting cellulitis, pilonidal sinus  Inflammatory bowel disease (Crohn  disease)    What causes hidradenitis suppurativa?  Hidradenitis suppurativa is an autoinflammatory disorder. Although the exact cause is not yet understood, contributing factors include:  Friction from clothing and body folds  Aberrant immune response to commensal bacteria  Abnormal cutaneous or follicular microbiome  Follicular occlusion  Release of pro-inflammatory cytokines  Inflammation causing rupture of the follicular wall and destroying apocrine glands and ducts  Secondary bacterial infection  Certain drugs.    What are the clinical features of hidradenitis suppurativa?  Hidradenitis can affect a single or multiple areas in the armpits, neck, sub mammary area, and inner thighs. Anogenital involvement most commonly affects the groin, mons pubis, vulva (in females), sides of the scrotum (in males), perineum, buttocks and perianal folds.  Signs include:  Open and closed comedones  Painful firm papules, larger nodules and pleated ridges  Pustules, fluctuant pseudocysts and abscesses  Pyogenic granulomas  Draining sinuses linking inflammatory lesions  Hypertrophic and atrophic scars.    Many patients with hidradenitis suppurativa also suffer from other skin disorders, including acne, hirsutism and psoriasis.    The severity and extent of hidradenitis suppurativa is recorded at assessment and when determining the impact of a treatment. The Rivera system describes three distinct clinical stages:  Solitary or multiple, isolated abscess formation without scarring or sinus tracts  Recurrent abscesses, single or multiple widely  lesions, with sinus tract formation  Diffuse or broad involvement, with multiple interconnected sinus tracts and abscesses.    Severe hidradenitis (Rivera Stage 3) has been associated with:  Male sex  Axillary and perianal involvement  Obesity  Smoking  Higher risk of stroke, coronary artery disease, heart failure, and peripheral artery disease  Disease duration.    What is the  treatment for hidradenitis suppurativa?  General measures  Weight loss; follow an anti-inflammatory, low-sugar, low-grain, low-dairy diet (mainly plants)  Smoking cessation: this can lead to improvement within a few months  Loose fitting clothing  Daily unfragranced antiperspirants  If prone to secondary infection, wash with antiseptics or take bleach baths  Apply hydrogen peroxide solution or medical grade honey to reduce malodour  Use peeling agents such as resorcinol 15% cream to de-roof nodules  Apply simple dressings to draining sinuses  Analgesics, such as paracetamol (acetaminophen), for pain control  Seek help to manage anxiety and depression.    Medical management of hidradenitis suppurativa  Medical management of hidradenitis suppurativa is difficult. Treatment is required long term. Effective options are listed below.    Antibiotics  Topical clindamycin, with benzoyl peroxide to reduce bacterial resistance  Short course of oral antibiotics for acute staphylococcal abscesses, eg flucloxacillin  Prolonged courses (minimum 3 months) of tetracycline, metronidazole, trimethoprim + sulphamethoxazole, fluoroquinolones, ertapenem or dapsone for their anti-inflammatory action  6-12 week courses of the combination of clindamycin (or doxycycline) and rifampicin for severe disease.    Antiandrogens  Long-term oral contraceptive pill; antiandrogenic progesterones drospirenone or cyproterone acetate may be more effective than standard combined pills. These are more suitable than progesterone-only pills or devices.  Spironolactone and finasteride  Response takes 6 months or longer.    Immunomodulatory treatments for severe disease  Intralesional corticosteroids into nodules  Systemic corticosteroids short-term for flares  Methotrexate, ciclosporin, and azathioprine  TNF-? inhibitors adalimumab and infliximab, used in higher dose than required for psoriasis, are the most successful treatments to date. Note that  paradoxically, they may sometimes induce new-onset hidradenitis suppurativa  Other biologics are under investigation, such as the IL-1? antagonist, canakinumab    Other medical treatments  Metformin in patients with insulin resistance  Acitretin (unsuitable for females of childbearing potential)  Isotretinoin -- effective for acne but appears unhelpful for most cases of hidradenitis  Colchicine  Medical management of anxiety and depression    Surgical management of hidradenitis suppurativa  Incision and drainage of acute abscesses  Curettage and deroofing of nodules, abscesses and sinuses  Laser ablation of nodules, abscesses and sinuses  Wide local excision of persistent nodules  Radical excisional surgery of entire affected area  Nd:YAG laser hair removal                  Scribe Attestation      I,:  Keely Nash MA am acting as a scribe while in the presence of the attending physician.:       I,:  Gabby Raymundo MD personally performed the services described in this documentation    as scribed in my presence.:

## 2025-04-17 ENCOUNTER — APPOINTMENT (OUTPATIENT)
Dept: LAB | Facility: HOSPITAL | Age: 25
End: 2025-04-17
Attending: SURGERY
Payer: COMMERCIAL

## 2025-04-17 DIAGNOSIS — K21.9 GASTROESOPHAGEAL REFLUX DISEASE WITHOUT ESOPHAGITIS: ICD-10-CM

## 2025-04-17 DIAGNOSIS — Z01.818 PREOP TESTING: ICD-10-CM

## 2025-04-17 DIAGNOSIS — E78.2 MIXED HYPERLIPIDEMIA: ICD-10-CM

## 2025-04-17 LAB
ALBUMIN SERPL BCG-MCNC: 4.6 G/DL (ref 3.5–5)
ALP SERPL-CCNC: 55 U/L (ref 34–104)
ALT SERPL W P-5'-P-CCNC: 11 U/L (ref 7–52)
ANION GAP SERPL CALCULATED.3IONS-SCNC: 8 MMOL/L (ref 4–13)
AST SERPL W P-5'-P-CCNC: 11 U/L (ref 13–39)
BILIRUB SERPL-MCNC: 0.58 MG/DL (ref 0.2–1)
BUN SERPL-MCNC: 13 MG/DL (ref 5–25)
CALCIUM SERPL-MCNC: 9.5 MG/DL (ref 8.4–10.2)
CHLORIDE SERPL-SCNC: 101 MMOL/L (ref 96–108)
CHOLEST SERPL-MCNC: 184 MG/DL (ref ?–200)
CO2 SERPL-SCNC: 28 MMOL/L (ref 21–32)
CREAT SERPL-MCNC: 0.76 MG/DL (ref 0.6–1.3)
ERYTHROCYTE [DISTWIDTH] IN BLOOD BY AUTOMATED COUNT: 14 % (ref 11.6–15.1)
EST. AVERAGE GLUCOSE BLD GHB EST-MCNC: 114 MG/DL
GFR SERPL CREATININE-BSD FRML MDRD: 108 ML/MIN/1.73SQ M
GLUCOSE P FAST SERPL-MCNC: 75 MG/DL (ref 65–99)
HBA1C MFR BLD: 5.6 %
HCT VFR BLD AUTO: 40 % (ref 34.8–46.1)
HDLC SERPL-MCNC: 44 MG/DL
HGB BLD-MCNC: 12.7 G/DL (ref 11.5–15.4)
LDLC SERPL CALC-MCNC: 116 MG/DL (ref 0–100)
MCH RBC QN AUTO: 27.2 PG (ref 26.8–34.3)
MCHC RBC AUTO-ENTMCNC: 31.8 G/DL (ref 31.4–37.4)
MCV RBC AUTO: 86 FL (ref 82–98)
NONHDLC SERPL-MCNC: 140 MG/DL
PLATELET # BLD AUTO: 418 THOUSANDS/UL (ref 149–390)
PMV BLD AUTO: 10 FL (ref 8.9–12.7)
POTASSIUM SERPL-SCNC: 4 MMOL/L (ref 3.5–5.3)
PROT SERPL-MCNC: 8.1 G/DL (ref 6.4–8.4)
RBC # BLD AUTO: 4.67 MILLION/UL (ref 3.81–5.12)
SODIUM SERPL-SCNC: 137 MMOL/L (ref 135–147)
TRIGL SERPL-MCNC: 122 MG/DL (ref ?–150)
TSH SERPL DL<=0.05 MIU/L-ACNC: 1.16 UIU/ML (ref 0.45–4.5)
WBC # BLD AUTO: 11.57 THOUSAND/UL (ref 4.31–10.16)

## 2025-04-17 PROCEDURE — 85027 COMPLETE CBC AUTOMATED: CPT

## 2025-04-17 PROCEDURE — 36415 COLL VENOUS BLD VENIPUNCTURE: CPT

## 2025-04-17 PROCEDURE — 84443 ASSAY THYROID STIM HORMONE: CPT

## 2025-04-17 PROCEDURE — 80061 LIPID PANEL: CPT

## 2025-04-17 PROCEDURE — 80053 COMPREHEN METABOLIC PANEL: CPT

## 2025-04-17 PROCEDURE — 83036 HEMOGLOBIN GLYCOSYLATED A1C: CPT

## 2025-05-14 ENCOUNTER — OFFICE VISIT (OUTPATIENT)
Dept: INTERNAL MEDICINE CLINIC | Facility: CLINIC | Age: 25
End: 2025-05-14

## 2025-05-14 VITALS
DIASTOLIC BLOOD PRESSURE: 78 MMHG | SYSTOLIC BLOOD PRESSURE: 132 MMHG | BODY MASS INDEX: 37.33 KG/M2 | WEIGHT: 252 LBS | OXYGEN SATURATION: 99 % | HEIGHT: 69 IN | HEART RATE: 68 BPM

## 2025-05-14 DIAGNOSIS — E78.2 MIXED HYPERLIPIDEMIA: ICD-10-CM

## 2025-05-14 DIAGNOSIS — D75.839 THROMBOCYTOSIS: Primary | ICD-10-CM

## 2025-05-14 DIAGNOSIS — D72.829 LEUKOCYTOSIS, UNSPECIFIED TYPE: ICD-10-CM

## 2025-05-14 DIAGNOSIS — J02.9 ACUTE PHARYNGITIS, UNSPECIFIED ETIOLOGY: ICD-10-CM

## 2025-05-14 DIAGNOSIS — Z00.00 ANNUAL PHYSICAL EXAM: ICD-10-CM

## 2025-05-14 RX ORDER — AZITHROMYCIN 250 MG/1
TABLET, FILM COATED ORAL
Qty: 6 TABLET | Refills: 0 | Status: SHIPPED | OUTPATIENT
Start: 2025-05-14 | End: 2025-05-19

## 2025-05-14 NOTE — ASSESSMENT & PLAN NOTE
Lab Results   Component Value Date    LDLCALC 116 (H) 04/17/2025   Above results reviewed    Recommended statin patient refused    Patient chooses low-fat low-cholesterol diet diet exercise to lose weight

## 2025-05-14 NOTE — PROGRESS NOTES
Adult Annual Physical  Name: Diane Gomez      : 2000      MRN: [id number]  Encounter Provider: Judah Henao MD  Encounter Date: 2025   Encounter department: [facility] INTERNAL MEDICINE    :  Assessment & Plan  Annual physical exam       Complete annual physical    Age-appropriate screening done    Preventive measure discussed    For counseling screening follow-up recommendations see attached encounter discharge instruction  Mixed hyperlipidemia  Lab Results   Component Value Date    LDLCALC 116 (H) 2025   Above results reviewed    Recommended statin patient refused    Patient chooses low-fat low-cholesterol diet diet exercise to lose weight       Acute pharyngitis, unspecified etiology  Fever low-grade about 4 days ago since then been having sore throat some discomfort minimal pain during swallowing fever subsided some nasal congestion and persistent sore throat unchanged in the last 48-hour on exam looks congested red no cervical lymphadenopathy tonsils slightly enlarged no difficulty swallowing patient not interested in testing for strep or any other COVID or any other testing    Will treat symptomatically with Claritin daily salt water gargles and Zithromax empirically to use as directed  Orders:  •  azithromycin (Zithromax) 250 mg tablet; Take 2 tablets (500 mg total) by mouth daily for 1 day, THEN 1 tablet (250 mg total) daily for 4 days.    Thrombocytosis  Lab Results   Component Value Date    WBC 11.57 (H) 2025    HGB 12.7 2025    HCT 40.0 2025    MCV 86 2025     (H) 2025   Although platelet elevated but improved from the previous CBC we will do another platelets CBC if needed hematology consult  Orders:  •  CBC and differential; Future    Leukocytosis, unspecified type  Above CBC reviewed minimal leukocytosis persistent will repeat CBC monitor closely  Orders:  •  CBC and differential; Future        Preventive  Screenings:    - Cervical cancer screening: screening up-to-date     Immunizations:  - Immunizations due: Tdap         History of Present Illness     Patient came in for annual physical and also complaining of sore throat and review of the lab all the lab reviewed symptoms started 4 days ago with nasal congestion now sore throat some difficulty swallowing no coughing no fever chills tested negative for COVID no coughing no chest pain for details refer to assessment plan visit diagnosis    Adult Annual Physical:  Patient presents for annual physical.     Diet and Physical Activity:  - Diet/Nutrition: no special diet.  - Exercise: walking, 30-60 minutes on average and 3-4 times a week on average. the gym sometimes    Depression Screening:  - PHQ-2 Score: 0    General Health:  - Sleep: 7-8 hours of sleep on average and sleeps well.  - Hearing: normal hearing bilateral ears.  - Vision: wears glasses, vision problems and most recent eye exam > 1 year ago.  - Dental: regular dental visits, no dental visits for > 1 year and brushes teeth twice daily.    /GYN Health:  - Follows with GYN: yes.   - History of STDs: yes  - Contraception:. none      Advanced Care Planning:  - Has an advanced directive?: no    - Has a durable medical POA?: no    - ACP document given to patient?: no      Review of Systems   Constitutional:  Negative for activity change, appetite change, chills, diaphoresis, fatigue, fever and unexpected weight change.   HENT:  Positive for congestion and sore throat. Negative for dental problem, drooling, ear discharge, ear pain, facial swelling, hearing loss, mouth sores, nosebleeds, postnasal drip, rhinorrhea, sinus pressure, sneezing, tinnitus, trouble swallowing and voice change.    Eyes:  Negative for photophobia, pain, discharge, redness, itching and visual disturbance.   Respiratory:  Negative for apnea, cough, choking, chest tightness, shortness of breath, wheezing and stridor.    Cardiovascular:   Negative for chest pain, palpitations and leg swelling.   Gastrointestinal:  Negative for abdominal distention, abdominal pain, anal bleeding, blood in stool, constipation, diarrhea, nausea, rectal pain and vomiting.   Endocrine: Negative for cold intolerance, heat intolerance, polydipsia, polyphagia and polyuria.   Genitourinary:  Negative for decreased urine volume, difficulty urinating, dysuria, enuresis, flank pain, frequency, genital sores, hematuria and urgency.   Musculoskeletal:  Negative for arthralgias, back pain, gait problem, joint swelling, myalgias, neck pain and neck stiffness.   Skin:  Negative for color change, pallor, rash and wound.   Allergic/Immunologic: Negative.  Negative for environmental allergies, food allergies and immunocompromised state.   Neurological:  Negative for dizziness, tremors, seizures, syncope, facial asymmetry, speech difficulty, weakness, light-headedness, numbness and headaches.   Psychiatric/Behavioral:  Negative for agitation, behavioral problems, confusion, decreased concentration, dysphoric mood, hallucinations, self-injury, sleep disturbance and suicidal ideas. The patient is not nervous/anxious and is not hyperactive.      Pertinent Medical History           Medical History Reviewed by provider this encounter:  Tobacco  Allergies  Meds  Problems  Med Hx  Surg Hx  Fam Hx     .  Past Medical History   Past Medical History:   Diagnosis Date   • High cholesterol    • Medical history reviewed with no changes    • Morbid (severe) obesity due to excess calories (HCC) 10/19/2022    Refer to BMI       Past Surgical History:   Procedure Laterality Date   • COLONOSCOPY W/ ENDOSCOPIC   2019   • WISDOM TOOTH EXTRACTION  06/2021     Family History   Problem Relation Name Age of Onset   • No Known Problems Mother     • No Known Problems Father     • Cancer Maternal Aunt          s/p partial hysterectomy but unsure type of cancer   • Cancer Maternal Grandmother April Good   "  • COPD Maternal Grandmother Inna Barrios       reports that she has never smoked. She has never used smokeless tobacco. She reports current alcohol use of about 1.0 standard drink of alcohol per week. She reports that she does not use drugs.  No current outpatient medications  Allergies[1]   Medications Ordered Prior to Encounter[2]   Social History     Tobacco Use   • Smoking status: Never   • Smokeless tobacco: Never   Vaping Use   • Vaping status: Never Used   Substance and Sexual Activity   • Alcohol use: Yes     Alcohol/week: 1.0 standard drink of alcohol     Types: 1 Glasses of wine per week     Comment: Casual alcohol assumption   • Drug use: Never   • Sexual activity: Yes     Partners: Male     Birth control/protection: None, Condom Male       Objective   /78   Pulse 68   Ht 5' 9\" (1.753 m)   Wt 114 kg (252 lb)   SpO2 99%   BMI 37.21 kg/m²     Physical Exam  Vitals and nursing note reviewed.   Constitutional:       General: She is not in acute distress.     Appearance: She is well-developed. She is obese. She is not ill-appearing, toxic-appearing or diaphoretic.   HENT:      Head: Normocephalic and atraumatic.      Right Ear: External ear normal.      Left Ear: External ear normal.      Nose: Congestion present.      Mouth/Throat:      Pharynx: Oropharyngeal exudate present.     Eyes:      General: Lids are normal. Lids are everted, no foreign bodies appreciated. No scleral icterus.        Right eye: No discharge.         Left eye: No discharge.      Conjunctiva/sclera: Conjunctivae normal.      Pupils: Pupils are equal, round, and reactive to light.     Neck:      Thyroid: No thyromegaly.      Vascular: Normal carotid pulses. No carotid bruit, hepatojugular reflux or JVD.      Trachea: No tracheal tenderness or tracheal deviation.     Cardiovascular:      Rate and Rhythm: Normal rate and regular rhythm.      Pulses: Normal pulses.      Heart sounds: Normal heart sounds. No murmur heard.     No " friction rub. No gallop.   Pulmonary:      Effort: Pulmonary effort is normal. No respiratory distress.      Breath sounds: Normal breath sounds. No stridor. No wheezing or rales.   Chest:      Chest wall: No tenderness.   Abdominal:      General: Bowel sounds are normal. There is no distension.      Palpations: Abdomen is soft. There is no mass.      Tenderness: There is no abdominal tenderness. There is no guarding or rebound.     Musculoskeletal:         General: No tenderness or deformity. Normal range of motion.      Cervical back: Normal range of motion and neck supple. No edema, erythema or rigidity. No spinous process tenderness or muscular tenderness. Normal range of motion.   Lymphadenopathy:      Head:      Right side of head: No submental, submandibular, tonsillar, preauricular or posterior auricular adenopathy.      Left side of head: No submental, submandibular, tonsillar, preauricular, posterior auricular or occipital adenopathy.      Cervical: No cervical adenopathy.      Right cervical: No superficial, deep or posterior cervical adenopathy.     Left cervical: No superficial, deep or posterior cervical adenopathy.      Upper Body:      Right upper body: No pectoral adenopathy.      Left upper body: No pectoral adenopathy.     Skin:     General: Skin is warm and dry.      Coloration: Skin is not pale.      Findings: No erythema or rash.     Neurological:      Mental Status: She is alert and oriented to person, place, and time.      Cranial Nerves: No cranial nerve deficit.      Sensory: No sensory deficit.      Motor: No tremor, abnormal muscle tone or seizure activity.      Coordination: Coordination normal.      Gait: Gait normal.      Deep Tendon Reflexes: Reflexes are normal and symmetric. Reflexes normal.     Psychiatric:         Behavior: Behavior normal.         Thought Content: Thought content normal.         Judgment: Judgment normal.                [1]  No Known Allergies[2]  No current  outpatient medications on file prior to visit.     No current facility-administered medications on file prior to visit.

## 2025-05-14 NOTE — PATIENT INSTRUCTIONS
"Patient Education     Routine physical for adults   The Basics   Written by the doctors and editors at [facility]   What is a physical? -- A physical is a routine visit, or \"check-up,\" with your doctor. You might also hear it called a \"wellness visit\" or \"preventive visit.\"  During each visit, the doctor will:   Ask about your physical and mental health   Ask about your habits, behaviors, and lifestyle   Do an exam   Give you vaccines if needed   Talk to you about any medicines you take   Give advice about your health   Answer your questions  Getting regular check-ups is an important part of taking care of your health. It can help your doctor find and treat any problems you have. But it's also important for preventing health problems.  A routine physical is different from a \"sick visit.\" A sick visit is when you see a doctor because of a health concern or problem. Since physicals are scheduled ahead of time, you can think about what you want to ask the doctor.  How often should I get a physical? -- It depends on your age and health. In general, for people age 21 years and older:   If you are younger than 50 years, you might be able to get a physical every 3 years.   If you are 50 years or older, your doctor might recommend a physical every year.  If you have an ongoing health condition, like diabetes or high blood pressure, your doctor will probably want to see you more often.  What happens during a physical? -- In general, each visit will include:   Physical exam - The doctor or nurse will check your height, weight, heart rate, and blood pressure. They will also look at your eyes and ears. They will ask about how you are feeling and whether you have any symptoms that bother you.   Medicines - It's a good idea to bring a list of all the medicines you take to each doctor visit. Your doctor will talk to you about your medicines and answer any questions. Tell them if you are having any side effects that bother you. You " "should also tell them if you are having trouble paying for any of your medicines.   Habits and behaviors - This includes:   Your diet   Your exercise habits   Whether you smoke, drink alcohol, or use drugs   Whether you are sexually active   Whether you feel safe at home  Your doctor will talk to you about things you can do to improve your health and lower your risk of health problems. They will also offer help and support. For example, if you want to quit smoking, they can give you advice and might prescribe medicines. If you want to improve your diet or get more physical activity, they can help you with this, too.   Lab tests, if needed - The tests you get will depend on your age and situation. For example, your doctor might want to check your:   Cholesterol   Blood sugar   Iron level   Vaccines - The recommended vaccines will depend on your age, health, and what vaccines you already had. Vaccines are very important because they can prevent certain serious or deadly infections.   Discussion of screening - \"Screening\" means checking for diseases or other health problems before they cause symptoms. Your doctor can recommend screening based on your age, risk, and preferences. This might include tests to check for:   Cancer, such as breast, prostate, cervical, ovarian, colorectal, prostate, lung, or skin cancer   Sexually transmitted infections, such as chlamydia and gonorrhea   Mental health conditions like depression and anxiety  Your doctor will talk to you about the different types of screening tests. They can help you decide which screenings to have. They can also explain what the results might mean.   Answering questions - The physical is a good time to ask the doctor or nurse questions about your health. If needed, they can refer you to other doctors or specialists, too.  Adults older than 65 years often need other care, too. As you get older, your doctor will talk to you about:   How to prevent falling at " home   Hearing or vision tests   Memory testing   How to take your medicines safely   Making sure that you have the help and support you need at home  All topics are updated as new evidence becomes available and our peer review process is complete.  This topic retrieved from [employer] on: May 02, 2024.  Topic 854311 Version 1.0  Release: 32.4.3 - C32.122  © 2024 [employer] and/or its affiliates. All rights reserved.  Consumer Information Use and Disclaimer   Disclaimer: This generalized information is a limited summary of diagnosis, treatment, and/or medication information. It is not meant to be comprehensive and should be used as a tool to help the user understand and/or assess potential diagnostic and treatment options. It does NOT include all information about conditions, treatments, medications, side effects, or risks that may apply to a specific patient. It is not intended to be medical advice or a substitute for the medical advice, diagnosis, or treatment of a health care provider based on the health care provider's examination and assessment of a patient's specific and unique circumstances. Patients must speak with a health care provider for complete information about their health, medical questions, and treatment options, including any risks or benefits regarding use of medications. This information does not endorse any treatments or medications as safe, effective, or approved for treating a specific patient. [employer] and its affiliates disclaim any warranty or liability relating to this information or the use thereof.The use of this information is governed by the Terms of Use, available at https://www.[employer]RGB Networksuwer.com/en/know/clinical-effectiveness-terms. 2024© [employer] and its affiliates and/or licensors. All rights reserved.  Copyright   © 2024 [employer] and/or its affiliates. All rights reserved.

## 2025-07-29 ENCOUNTER — ANNUAL EXAM (OUTPATIENT)
Dept: OBGYN CLINIC | Facility: CLINIC | Age: 25
End: 2025-07-29
Payer: COMMERCIAL

## 2025-07-29 VITALS — WEIGHT: 262 LBS | BODY MASS INDEX: 38.69 KG/M2 | SYSTOLIC BLOOD PRESSURE: 120 MMHG | DIASTOLIC BLOOD PRESSURE: 80 MMHG

## 2025-07-29 DIAGNOSIS — Z01.419 WOMEN'S ANNUAL ROUTINE GYNECOLOGICAL EXAMINATION: Primary | ICD-10-CM

## 2025-07-29 PROCEDURE — S0612 ANNUAL GYNECOLOGICAL EXAMINA: HCPCS | Performed by: NURSE PRACTITIONER
